# Patient Record
Sex: MALE | Race: WHITE | URBAN - METROPOLITAN AREA
[De-identification: names, ages, dates, MRNs, and addresses within clinical notes are randomized per-mention and may not be internally consistent; named-entity substitution may affect disease eponyms.]

---

## 2023-08-28 ENCOUNTER — HOSPITAL ENCOUNTER (OUTPATIENT)
Age: 73
Setting detail: SPECIMEN
Discharge: HOME | End: 2023-08-28

## 2023-08-28 LAB
ALBUMIN SERPL-MCNC: 3.2 G/DL (ref 3.5–5.2)
ALP SERPL-CCNC: 104 U/L (ref 40–129)
ALT SERPL-CCNC: 31 U/L (ref 5–41)
ANION GAP SERPL CALCULATED.3IONS-SCNC: 11 MMOL/L (ref 9–17)
AST SERPL-CCNC: 29 U/L
BILIRUB SERPL-MCNC: 0.3 MG/DL (ref 0.3–1.2)
BUN SERPL-MCNC: 25 MG/DL (ref 8–23)
BUN/CREAT SERPL: 28 (ref 9–20)
CALCIUM SERPL-MCNC: 9.4 MG/DL (ref 8.6–10.4)
CHLORIDE SERPL-SCNC: 104 MMOL/L (ref 98–107)
CO2 SERPL-SCNC: 25 MMOL/L (ref 20–31)
CREAT SERPL-MCNC: 0.9 MG/DL (ref 0.7–1.2)
ERYTHROCYTE [DISTWIDTH] IN BLOOD BY AUTOMATED COUNT: 14.4 % (ref 11.8–14.4)
GFR SERPL CREATININE-BSD FRML MDRD: >60 ML/MIN/1.73M2
GLUCOSE SERPL-MCNC: 134 MG/DL (ref 70–99)
HCT VFR BLD AUTO: 35.5 % (ref 40.7–50.3)
HGB BLD-MCNC: 11.8 G/DL (ref 13–17)
MCH RBC QN AUTO: 30.5 PG (ref 25.2–33.5)
MCHC RBC AUTO-ENTMCNC: 33.2 G/DL (ref 28.4–34.8)
MCV RBC AUTO: 91.7 FL (ref 82.6–102.9)
NRBC BLD-RTO: 0 PER 100 WBC
PLATELET # BLD AUTO: 301 K/UL (ref 138–453)
PMV BLD AUTO: 9.4 FL (ref 8.1–13.5)
POTASSIUM SERPL-SCNC: 4 MMOL/L (ref 3.7–5.3)
PROT SERPL-MCNC: 6.6 G/DL (ref 6.4–8.3)
RBC # BLD AUTO: 3.87 M/UL (ref 4.21–5.77)
SODIUM SERPL-SCNC: 140 MMOL/L (ref 135–144)
WBC OTHER # BLD: 9.5 K/UL (ref 3.5–11.3)

## 2023-08-28 PROCEDURE — 85027 COMPLETE CBC AUTOMATED: CPT

## 2023-08-28 PROCEDURE — 36415 COLL VENOUS BLD VENIPUNCTURE: CPT

## 2023-08-28 PROCEDURE — P9603 ONE-WAY ALLOW PRORATED MILES: HCPCS

## 2023-08-28 PROCEDURE — 80053 COMPREHEN METABOLIC PANEL: CPT

## 2023-09-07 ENCOUNTER — HOSPITAL ENCOUNTER (INPATIENT)
Age: 73
LOS: 15 days | Discharge: ANOTHER HOSPITAL | End: 2023-09-22
Attending: EMERGENCY MEDICINE | Admitting: STUDENT IN AN ORGANIZED HEALTH CARE EDUCATION/TRAINING PROGRAM
Payer: COMMERCIAL

## 2023-09-07 ENCOUNTER — APPOINTMENT (OUTPATIENT)
Dept: CT IMAGING | Age: 73
End: 2023-09-07
Payer: COMMERCIAL

## 2023-09-07 DIAGNOSIS — R20.0 NUMBNESS: ICD-10-CM

## 2023-09-07 DIAGNOSIS — R79.89 ELEVATED TROPONIN: Primary | ICD-10-CM

## 2023-09-07 DIAGNOSIS — C79.51 CANCER, METASTATIC TO BONE (HCC): ICD-10-CM

## 2023-09-07 DIAGNOSIS — R91.8 LUNG MASS: ICD-10-CM

## 2023-09-07 DIAGNOSIS — R53.1 GENERALIZED WEAKNESS: ICD-10-CM

## 2023-09-07 PROBLEM — R41.82 ALTERED MENTAL STATUS: Status: ACTIVE | Noted: 2023-09-07

## 2023-09-07 PROBLEM — I73.9 PERIPHERAL ARTERIAL DISEASE (HCC): Status: ACTIVE | Noted: 2023-08-04

## 2023-09-07 PROBLEM — F17.210 CIGARETTE SMOKER: Status: ACTIVE | Noted: 2023-08-04

## 2023-09-07 PROBLEM — S88.119A BELOW KNEE AMPUTATION (HCC): Status: ACTIVE | Noted: 2023-08-04

## 2023-09-07 LAB
ALBUMIN SERPL-MCNC: 3.2 G/DL (ref 3.5–5.2)
ALBUMIN/GLOB SERPL: 0.9 {RATIO} (ref 1–2.5)
ALP SERPL-CCNC: 123 U/L (ref 40–129)
ALT SERPL-CCNC: 47 U/L (ref 5–41)
ANION GAP SERPL CALCULATED.3IONS-SCNC: 7 MMOL/L (ref 9–17)
AST SERPL-CCNC: 38 U/L
BASOPHILS # BLD: 0.1 K/UL (ref 0–0.2)
BASOPHILS NFR BLD: 1 % (ref 0–2)
BILIRUB DIRECT SERPL-MCNC: 0.1 MG/DL
BILIRUB INDIRECT SERPL-MCNC: 0.3 MG/DL (ref 0–1)
BILIRUB SERPL-MCNC: 0.4 MG/DL (ref 0.3–1.2)
BILIRUB UR QL STRIP: NEGATIVE
BUN SERPL-MCNC: 19 MG/DL (ref 8–23)
CALCIUM SERPL-MCNC: 9 MG/DL (ref 8.6–10.4)
CHLORIDE SERPL-SCNC: 98 MMOL/L (ref 98–107)
CLARITY UR: CLEAR
CO2 SERPL-SCNC: 27 MMOL/L (ref 20–31)
COLOR UR: YELLOW
COMMENT: NORMAL
CREAT SERPL-MCNC: 0.8 MG/DL (ref 0.7–1.2)
EOSINOPHIL # BLD: 0.1 K/UL (ref 0–0.4)
EOSINOPHILS RELATIVE PERCENT: 2 % (ref 1–4)
ERYTHROCYTE [DISTWIDTH] IN BLOOD BY AUTOMATED COUNT: 14.6 % (ref 12.5–15.4)
GFR SERPL CREATININE-BSD FRML MDRD: >60 ML/MIN/1.73M2
GLUCOSE BLD-MCNC: 205 MG/DL (ref 75–110)
GLUCOSE SERPL-MCNC: 268 MG/DL (ref 70–99)
GLUCOSE UR STRIP-MCNC: NEGATIVE MG/DL
HCT VFR BLD AUTO: 37.3 % (ref 41–53)
HGB BLD-MCNC: 12.6 G/DL (ref 13.5–17.5)
HGB UR QL STRIP.AUTO: NEGATIVE
KETONES UR STRIP-MCNC: NEGATIVE MG/DL
LEUKOCYTE ESTERASE UR QL STRIP: NEGATIVE
LYMPHOCYTES NFR BLD: 2.1 K/UL (ref 1–4.8)
LYMPHOCYTES RELATIVE PERCENT: 25 % (ref 24–44)
MAGNESIUM SERPL-MCNC: 1.7 MG/DL (ref 1.6–2.6)
MCH RBC QN AUTO: 30.6 PG (ref 26–34)
MCHC RBC AUTO-ENTMCNC: 33.7 G/DL (ref 31–37)
MCV RBC AUTO: 90.7 FL (ref 80–100)
MONOCYTES NFR BLD: 0.9 K/UL (ref 0.1–1.2)
MONOCYTES NFR BLD: 11 % (ref 2–11)
NEUTROPHILS NFR BLD: 61 % (ref 36–66)
NEUTS SEG NFR BLD: 5.3 K/UL (ref 1.8–7.7)
NITRITE UR QL STRIP: NEGATIVE
PH UR STRIP: 6 [PH] (ref 5–8)
PLATELET # BLD AUTO: 324 K/UL (ref 140–450)
PMV BLD AUTO: 7.2 FL (ref 6–12)
POTASSIUM SERPL-SCNC: 4.1 MMOL/L (ref 3.7–5.3)
PROT SERPL-MCNC: 6.7 G/DL (ref 6.4–8.3)
PROT UR STRIP-MCNC: NEGATIVE MG/DL
RBC # BLD AUTO: 4.12 M/UL (ref 4.5–5.9)
SARS-COV-2 RDRP RESP QL NAA+PROBE: NOT DETECTED
SODIUM SERPL-SCNC: 132 MMOL/L (ref 135–144)
SP GR UR STRIP: 1.02 (ref 1–1.03)
SPECIMEN DESCRIPTION: NORMAL
TROPONIN I SERPL HS-MCNC: 48 NG/L (ref 0–22)
TROPONIN I SERPL HS-MCNC: 53 NG/L (ref 0–22)
TROPONIN I SERPL HS-MCNC: 55 NG/L (ref 0–22)
TSH SERPL DL<=0.05 MIU/L-ACNC: 0.93 UIU/ML (ref 0.3–5)
UROBILINOGEN UR STRIP-ACNC: NORMAL EU/DL (ref 0–1)
WBC OTHER # BLD: 8.5 K/UL (ref 3.5–11)

## 2023-09-07 PROCEDURE — 93005 ELECTROCARDIOGRAM TRACING: CPT | Performed by: EMERGENCY MEDICINE

## 2023-09-07 PROCEDURE — 6370000000 HC RX 637 (ALT 250 FOR IP): Performed by: STUDENT IN AN ORGANIZED HEALTH CARE EDUCATION/TRAINING PROGRAM

## 2023-09-07 PROCEDURE — 6360000004 HC RX CONTRAST MEDICATION: Performed by: EMERGENCY MEDICINE

## 2023-09-07 PROCEDURE — 70498 CT ANGIOGRAPHY NECK: CPT

## 2023-09-07 PROCEDURE — 80048 BASIC METABOLIC PNL TOTAL CA: CPT

## 2023-09-07 PROCEDURE — 82947 ASSAY GLUCOSE BLOOD QUANT: CPT

## 2023-09-07 PROCEDURE — 36415 COLL VENOUS BLD VENIPUNCTURE: CPT

## 2023-09-07 PROCEDURE — 70496 CT ANGIOGRAPHY HEAD: CPT

## 2023-09-07 PROCEDURE — 84484 ASSAY OF TROPONIN QUANT: CPT

## 2023-09-07 PROCEDURE — 84443 ASSAY THYROID STIM HORMONE: CPT

## 2023-09-07 PROCEDURE — 6370000000 HC RX 637 (ALT 250 FOR IP)

## 2023-09-07 PROCEDURE — 2580000003 HC RX 258: Performed by: STUDENT IN AN ORGANIZED HEALTH CARE EDUCATION/TRAINING PROGRAM

## 2023-09-07 PROCEDURE — 87635 SARS-COV-2 COVID-19 AMP PRB: CPT

## 2023-09-07 PROCEDURE — 99222 1ST HOSP IP/OBS MODERATE 55: CPT | Performed by: STUDENT IN AN ORGANIZED HEALTH CARE EDUCATION/TRAINING PROGRAM

## 2023-09-07 PROCEDURE — 99285 EMERGENCY DEPT VISIT HI MDM: CPT

## 2023-09-07 PROCEDURE — 1200000000 HC SEMI PRIVATE

## 2023-09-07 PROCEDURE — 70450 CT HEAD/BRAIN W/O DYE: CPT

## 2023-09-07 PROCEDURE — 80076 HEPATIC FUNCTION PANEL: CPT

## 2023-09-07 PROCEDURE — 83735 ASSAY OF MAGNESIUM: CPT

## 2023-09-07 PROCEDURE — 2580000003 HC RX 258: Performed by: EMERGENCY MEDICINE

## 2023-09-07 PROCEDURE — 81003 URINALYSIS AUTO W/O SCOPE: CPT

## 2023-09-07 PROCEDURE — 85025 COMPLETE CBC W/AUTO DIFF WBC: CPT

## 2023-09-07 RX ORDER — 0.9 % SODIUM CHLORIDE 0.9 %
80 INTRAVENOUS SOLUTION INTRAVENOUS ONCE
Status: DISCONTINUED | OUTPATIENT
Start: 2023-09-07 | End: 2023-09-22 | Stop reason: HOSPADM

## 2023-09-07 RX ORDER — CYCLOBENZAPRINE HCL 10 MG
5 TABLET ORAL 2 TIMES DAILY PRN
Status: DISCONTINUED | OUTPATIENT
Start: 2023-09-07 | End: 2023-09-10

## 2023-09-07 RX ORDER — SODIUM CHLORIDE 0.9 % (FLUSH) 0.9 %
5-40 SYRINGE (ML) INJECTION PRN
Status: DISCONTINUED | OUTPATIENT
Start: 2023-09-07 | End: 2023-09-22 | Stop reason: HOSPADM

## 2023-09-07 RX ORDER — DEXTROSE MONOHYDRATE 100 MG/ML
INJECTION, SOLUTION INTRAVENOUS CONTINUOUS PRN
Status: DISCONTINUED | OUTPATIENT
Start: 2023-09-07 | End: 2023-09-22 | Stop reason: HOSPADM

## 2023-09-07 RX ORDER — OMEPRAZOLE 20 MG/1
20 CAPSULE, DELAYED RELEASE ORAL DAILY
COMMUNITY

## 2023-09-07 RX ORDER — CHOLECALCIFEROL (VITAMIN D3) 125 MCG
5 CAPSULE ORAL DAILY
COMMUNITY

## 2023-09-07 RX ORDER — ALOGLIPTIN 12.5 MG/1
12.5 TABLET, FILM COATED ORAL DAILY
Status: DISCONTINUED | OUTPATIENT
Start: 2023-09-07 | End: 2023-09-22 | Stop reason: HOSPADM

## 2023-09-07 RX ORDER — CARVEDILOL 12.5 MG/1
12.5 TABLET ORAL 2 TIMES DAILY WITH MEALS
COMMUNITY

## 2023-09-07 RX ORDER — INSULIN LISPRO 100 [IU]/ML
0-8 INJECTION, SOLUTION INTRAVENOUS; SUBCUTANEOUS
Status: DISCONTINUED | OUTPATIENT
Start: 2023-09-08 | End: 2023-09-15

## 2023-09-07 RX ORDER — CYCLOBENZAPRINE HCL 5 MG
5 TABLET ORAL 2 TIMES DAILY PRN
COMMUNITY

## 2023-09-07 RX ORDER — ACETAMINOPHEN 325 MG/1
TABLET ORAL
Status: COMPLETED
Start: 2023-09-07 | End: 2023-09-07

## 2023-09-07 RX ORDER — CARVEDILOL 12.5 MG/1
12.5 TABLET ORAL 2 TIMES DAILY WITH MEALS
Status: DISCONTINUED | OUTPATIENT
Start: 2023-09-07 | End: 2023-09-09

## 2023-09-07 RX ORDER — INSULIN ASPART 100 [IU]/ML
INJECTION, SOLUTION INTRAVENOUS; SUBCUTANEOUS
COMMUNITY

## 2023-09-07 RX ORDER — ACETAMINOPHEN 650 MG/1
650 SUPPOSITORY RECTAL EVERY 6 HOURS PRN
Status: DISCONTINUED | OUTPATIENT
Start: 2023-09-07 | End: 2023-09-22 | Stop reason: HOSPADM

## 2023-09-07 RX ORDER — SODIUM CHLORIDE 9 MG/ML
INJECTION, SOLUTION INTRAVENOUS PRN
Status: DISCONTINUED | OUTPATIENT
Start: 2023-09-07 | End: 2023-09-22 | Stop reason: HOSPADM

## 2023-09-07 RX ORDER — TAMSULOSIN HYDROCHLORIDE 0.4 MG/1
0.4 CAPSULE ORAL DAILY
Status: DISCONTINUED | OUTPATIENT
Start: 2023-09-08 | End: 2023-09-22 | Stop reason: HOSPADM

## 2023-09-07 RX ORDER — HYDROCODONE BITARTRATE AND ACETAMINOPHEN 5; 325 MG/1; MG/1
1 TABLET ORAL EVERY 4 HOURS PRN
COMMUNITY

## 2023-09-07 RX ORDER — SODIUM CHLORIDE 0.9 % (FLUSH) 0.9 %
10 SYRINGE (ML) INJECTION ONCE
Status: COMPLETED | OUTPATIENT
Start: 2023-09-07 | End: 2023-09-07

## 2023-09-07 RX ORDER — GABAPENTIN 300 MG/1
600 CAPSULE ORAL 2 TIMES DAILY
Status: DISCONTINUED | OUTPATIENT
Start: 2023-09-07 | End: 2023-09-22 | Stop reason: HOSPADM

## 2023-09-07 RX ORDER — PANTOPRAZOLE SODIUM 40 MG/1
40 TABLET, DELAYED RELEASE ORAL
Status: DISCONTINUED | OUTPATIENT
Start: 2023-09-08 | End: 2023-09-22 | Stop reason: HOSPADM

## 2023-09-07 RX ORDER — ONDANSETRON 4 MG/1
4 TABLET, ORALLY DISINTEGRATING ORAL EVERY 8 HOURS PRN
Status: DISCONTINUED | OUTPATIENT
Start: 2023-09-07 | End: 2023-09-22 | Stop reason: HOSPADM

## 2023-09-07 RX ORDER — CEPHALEXIN 500 MG/1
500 CAPSULE ORAL 3 TIMES DAILY
Status: ON HOLD | COMMUNITY
Start: 2023-09-01 | End: 2023-09-21 | Stop reason: HOSPADM

## 2023-09-07 RX ORDER — GABAPENTIN 600 MG/1
600 TABLET ORAL 2 TIMES DAILY
COMMUNITY

## 2023-09-07 RX ORDER — ATORVASTATIN CALCIUM 80 MG/1
80 TABLET, FILM COATED ORAL DAILY
COMMUNITY

## 2023-09-07 RX ORDER — ONDANSETRON 2 MG/ML
4 INJECTION INTRAMUSCULAR; INTRAVENOUS EVERY 6 HOURS PRN
Status: DISCONTINUED | OUTPATIENT
Start: 2023-09-07 | End: 2023-09-22 | Stop reason: HOSPADM

## 2023-09-07 RX ORDER — ALOGLIPTIN 12.5 MG/1
12.5 TABLET, FILM COATED ORAL DAILY
COMMUNITY

## 2023-09-07 RX ORDER — TAMSULOSIN HYDROCHLORIDE 0.4 MG/1
0.4 CAPSULE ORAL DAILY
COMMUNITY

## 2023-09-07 RX ORDER — ATORVASTATIN CALCIUM 40 MG/1
80 TABLET, FILM COATED ORAL DAILY
Status: DISCONTINUED | OUTPATIENT
Start: 2023-09-07 | End: 2023-09-22 | Stop reason: HOSPADM

## 2023-09-07 RX ORDER — INSULIN LISPRO 100 [IU]/ML
0-4 INJECTION, SOLUTION INTRAVENOUS; SUBCUTANEOUS NIGHTLY
Status: DISCONTINUED | OUTPATIENT
Start: 2023-09-07 | End: 2023-09-15

## 2023-09-07 RX ORDER — GLUCAGON 1 MG/ML
1 KIT INJECTION PRN
Status: DISCONTINUED | OUTPATIENT
Start: 2023-09-07 | End: 2023-09-22 | Stop reason: HOSPADM

## 2023-09-07 RX ORDER — 0.9 % SODIUM CHLORIDE 0.9 %
1000 INTRAVENOUS SOLUTION INTRAVENOUS ONCE
Status: COMPLETED | OUTPATIENT
Start: 2023-09-07 | End: 2023-09-07

## 2023-09-07 RX ORDER — SODIUM CHLORIDE 0.9 % (FLUSH) 0.9 %
5-40 SYRINGE (ML) INJECTION EVERY 12 HOURS SCHEDULED
Status: DISCONTINUED | OUTPATIENT
Start: 2023-09-07 | End: 2023-09-22 | Stop reason: HOSPADM

## 2023-09-07 RX ORDER — ASPIRIN 81 MG/1
81 TABLET ORAL DAILY
Status: DISCONTINUED | OUTPATIENT
Start: 2023-09-07 | End: 2023-09-14

## 2023-09-07 RX ORDER — GABAPENTIN 600 MG/1
600 TABLET ORAL 2 TIMES DAILY
Status: DISCONTINUED | OUTPATIENT
Start: 2023-09-07 | End: 2023-09-07 | Stop reason: RX

## 2023-09-07 RX ORDER — ACETAMINOPHEN 325 MG/1
650 TABLET ORAL EVERY 6 HOURS PRN
Status: DISCONTINUED | OUTPATIENT
Start: 2023-09-07 | End: 2023-09-22 | Stop reason: HOSPADM

## 2023-09-07 RX ORDER — POLYETHYLENE GLYCOL 3350 17 G/17G
17 POWDER, FOR SOLUTION ORAL DAILY PRN
Status: DISCONTINUED | OUTPATIENT
Start: 2023-09-07 | End: 2023-09-22 | Stop reason: HOSPADM

## 2023-09-07 RX ORDER — ASPIRIN 81 MG/1
81 TABLET ORAL DAILY
COMMUNITY

## 2023-09-07 RX ORDER — FOLIC ACID 1 MG/1
1 TABLET ORAL DAILY
COMMUNITY

## 2023-09-07 RX ADMIN — ACETAMINOPHEN 650 MG: 325 TABLET ORAL at 17:58

## 2023-09-07 RX ADMIN — SODIUM CHLORIDE, PRESERVATIVE FREE 10 ML: 5 INJECTION INTRAVENOUS at 20:53

## 2023-09-07 RX ADMIN — SODIUM CHLORIDE, PRESERVATIVE FREE 10 ML: 5 INJECTION INTRAVENOUS at 13:02

## 2023-09-07 RX ADMIN — ATORVASTATIN CALCIUM 80 MG: 40 TABLET, FILM COATED ORAL at 20:51

## 2023-09-07 RX ADMIN — CYCLOBENZAPRINE 5 MG: 10 TABLET, FILM COATED ORAL at 18:16

## 2023-09-07 RX ADMIN — Medication 80 ML: at 13:03

## 2023-09-07 RX ADMIN — SODIUM CHLORIDE 1000 ML: 9 INJECTION, SOLUTION INTRAVENOUS at 12:47

## 2023-09-07 RX ADMIN — APIXABAN 5 MG: 5 TABLET, FILM COATED ORAL at 20:51

## 2023-09-07 RX ADMIN — IOPAMIDOL 100 ML: 755 INJECTION, SOLUTION INTRAVENOUS at 12:56

## 2023-09-07 RX ADMIN — GABAPENTIN 600 MG: 300 CAPSULE ORAL at 20:51

## 2023-09-07 RX ADMIN — CARVEDILOL 12.5 MG: 12.5 TABLET, FILM COATED ORAL at 20:51

## 2023-09-07 ASSESSMENT — ENCOUNTER SYMPTOMS
SHORTNESS OF BREATH: 0
ABDOMINAL PAIN: 0

## 2023-09-07 ASSESSMENT — PAIN - FUNCTIONAL ASSESSMENT: PAIN_FUNCTIONAL_ASSESSMENT: NONE - DENIES PAIN

## 2023-09-07 NOTE — ED NOTES
NEW FORWARD SEARCH     Clay Fox MD   Troponin result #2 =53 (down from 55)  Patient: Genevieve Estes   9/7/23   4:48 PM       Clay Fox MD   Patient:   Genevieve Estes    YOB: 1950  MRN:   [id number]  Location: [facility]    2TRAUMA-2TRAUMA   9/7/23 4:48 PM  [facility] [place] From: Trixie Acuna RN RE: Johanne Keyes RM: [id number] Troponin result #2 =53 (down from 54)  Luc Odonnell RN  09/07/23 6784

## 2023-09-07 NOTE — ED NOTES
Urine collected and sent to lab, however urine spilled onto brief and sheets, brief and sheets changed with 2 person assist, pt repositioned on left side.       Christine Redding RN  09/07/23 8928

## 2023-09-07 NOTE — ED PROVIDER NOTES
Respiratory:  Negative for shortness of breath. Cardiovascular:  Negative for chest pain. Gastrointestinal:  Negative for abdominal pain. Musculoskeletal:  Positive for gait problem. Neurological:  Positive for weakness and numbness. All other systems reviewed and are negative. Except as noted above the remainder of the review of systems was reviewed and negative. PAST MEDICAL HISTORY     Past Medical History:   Diagnosis Date    CAD (coronary artery disease)     Falls     Myocardial infarction ([address])     PVD (peripheral vascular disease) ([address])     Urinary retention          SURGICALHISTORY       Past Surgical History:   Procedure Laterality Date    CARDIAC SURGERY      LEG AMPUTATION BELOW KNEE Right          CURRENT MEDICATIONS       Previous Medications    ALOGLIPTIN (NESINA) 12.5 MG TABS TABLET    Take 1 tablet by mouth daily    APIXABAN (ELIQUIS) 5 MG TABS TABLET    Take by mouth 2 times daily    ASPIRIN 81 MG EC TABLET    Take 1 tablet by mouth daily    ATORVASTATIN (LIPITOR) 80 MG TABLET    Take 1 tablet by mouth daily    CARVEDILOL (COREG) 12.5 MG TABLET    Take 1 tablet by mouth 2 times daily (with meals)    CEPHALEXIN (KEFLEX) 500 MG CAPSULE    Take 1 capsule by mouth 3 times daily    CYCLOBENZAPRINE (FLEXERIL) 5 MG TABLET    Take 1 tablet by mouth 2 times daily as needed for Muscle spasms    FOLIC ACID (FOLVITE) 1 MG TABLET    Take 1 tablet by mouth daily    GABAPENTIN (NEURONTIN) 600 MG TABLET    Take 1 tablet by mouth 2 times daily. HYDROCODONE-ACETAMINOPHEN (NORCO) 5-325 MG PER TABLET    Take 1 tablet by mouth every 4 hours as needed for Pain.  Max Daily Amount: 6 tablets    INSULIN ASPART (NOVOLOG FLEXPEN) 100 UNIT/ML INJECTION PEN    Inject into the skin 3 times daily (before meals) Sliding scale    MELATONIN 5 MG TABS TABLET    Take 1 tablet by mouth daily    NICOTINE (NICODERM CQ)    Place 1 patch onto the skin daily 21mg/24hr    OMEPRAZOLE (PRILOSEC) 20 MG DELAYED RELEASE Medications   sodium chloride 0.9 % bolus 80 mL (80 mLs IntraVENous Bolus from Bag 9/7/23 1303)   sodium chloride 0.9 % bolus 1,000 mL (0 mLs IntraVENous Stopped 9/7/23 1559)   sodium chloride flush 0.9 % injection 10 mL (10 mLs IntraVENous Given 9/7/23 1302)   iopamidol (ISOVUE-370) 76 % injection 100 mL (100 mLs IntraVENous Given 9/7/23 1256)       New Prescriptions from this visit:    New Prescriptions    No medications on file       Follow-up:  No follow-up provider specified. Final Impression:   1. Elevated troponin    2.  Generalized weakness               (Please note that portions of this note were completed with a voice recognitionprogram.  Efforts were made to edit the dictations but occasionally words are mis-transcribed.)    Wallace Macdonald MD (electronically signed)  Attending Emergency Physician            Wallace Macdonald MD  09/07/23 5101

## 2023-09-08 ENCOUNTER — APPOINTMENT (OUTPATIENT)
Dept: MRI IMAGING | Age: 73
End: 2023-09-08
Payer: COMMERCIAL

## 2023-09-08 PROBLEM — I69.30 CEREBRAL MULTI-INFARCT STATE: Status: ACTIVE | Noted: 2023-09-08

## 2023-09-08 PROBLEM — G95.9 CERVICAL MYELOPATHY (HCC): Status: ACTIVE | Noted: 2023-09-08

## 2023-09-08 LAB
ANION GAP SERPL CALCULATED.3IONS-SCNC: 8 MMOL/L (ref 9–17)
BASOPHILS # BLD: 0.1 K/UL (ref 0–0.2)
BASOPHILS NFR BLD: 1 % (ref 0–2)
BUN SERPL-MCNC: 18 MG/DL (ref 8–23)
CALCIUM SERPL-MCNC: 9 MG/DL (ref 8.6–10.4)
CHLORIDE SERPL-SCNC: 103 MMOL/L (ref 98–107)
CO2 SERPL-SCNC: 26 MMOL/L (ref 20–31)
CREAT SERPL-MCNC: 0.7 MG/DL (ref 0.7–1.2)
EOSINOPHIL # BLD: 0.2 K/UL (ref 0–0.4)
EOSINOPHILS RELATIVE PERCENT: 2 % (ref 1–4)
ERYTHROCYTE [DISTWIDTH] IN BLOOD BY AUTOMATED COUNT: 14.3 % (ref 12.5–15.4)
GFR SERPL CREATININE-BSD FRML MDRD: >60 ML/MIN/1.73M2
GLUCOSE BLD-MCNC: 179 MG/DL (ref 75–110)
GLUCOSE BLD-MCNC: 187 MG/DL (ref 75–110)
GLUCOSE BLD-MCNC: 202 MG/DL (ref 75–110)
GLUCOSE SERPL-MCNC: 231 MG/DL (ref 70–99)
HCT VFR BLD AUTO: 35.5 % (ref 41–53)
HGB BLD-MCNC: 12 G/DL (ref 13.5–17.5)
LYMPHOCYTES NFR BLD: 2.5 K/UL (ref 1–4.8)
LYMPHOCYTES RELATIVE PERCENT: 33 % (ref 24–44)
MCH RBC QN AUTO: 30.6 PG (ref 26–34)
MCHC RBC AUTO-ENTMCNC: 33.8 G/DL (ref 31–37)
MCV RBC AUTO: 90.5 FL (ref 80–100)
MONOCYTES NFR BLD: 0.9 K/UL (ref 0.1–1.2)
MONOCYTES NFR BLD: 11 % (ref 2–11)
NEUTROPHILS NFR BLD: 53 % (ref 36–66)
NEUTS SEG NFR BLD: 4 K/UL (ref 1.8–7.7)
PLATELET # BLD AUTO: 319 K/UL (ref 140–450)
PMV BLD AUTO: 7.3 FL (ref 6–12)
POTASSIUM SERPL-SCNC: 4.2 MMOL/L (ref 3.7–5.3)
RBC # BLD AUTO: 3.92 M/UL (ref 4.5–5.9)
SODIUM SERPL-SCNC: 137 MMOL/L (ref 135–144)
TROPONIN I SERPL HS-MCNC: 43 NG/L (ref 0–22)
TROPONIN I SERPL HS-MCNC: 45 NG/L (ref 0–22)
TROPONIN I SERPL HS-MCNC: 46 NG/L (ref 0–22)
TROPONIN I SERPL HS-MCNC: 47 NG/L (ref 0–22)
WBC OTHER # BLD: 7.7 K/UL (ref 3.5–11)

## 2023-09-08 PROCEDURE — 2580000003 HC RX 258: Performed by: STUDENT IN AN ORGANIZED HEALTH CARE EDUCATION/TRAINING PROGRAM

## 2023-09-08 PROCEDURE — 99222 1ST HOSP IP/OBS MODERATE 55: CPT | Performed by: PSYCHIATRY & NEUROLOGY

## 2023-09-08 PROCEDURE — 99232 SBSQ HOSP IP/OBS MODERATE 35: CPT | Performed by: RADIOLOGY

## 2023-09-08 PROCEDURE — 84484 ASSAY OF TROPONIN QUANT: CPT

## 2023-09-08 PROCEDURE — 97166 OT EVAL MOD COMPLEX 45 MIN: CPT

## 2023-09-08 PROCEDURE — 85025 COMPLETE CBC W/AUTO DIFF WBC: CPT

## 2023-09-08 PROCEDURE — 80048 BASIC METABOLIC PNL TOTAL CA: CPT

## 2023-09-08 PROCEDURE — 72141 MRI NECK SPINE W/O DYE: CPT

## 2023-09-08 PROCEDURE — 6370000000 HC RX 637 (ALT 250 FOR IP): Performed by: STUDENT IN AN ORGANIZED HEALTH CARE EDUCATION/TRAINING PROGRAM

## 2023-09-08 PROCEDURE — 82947 ASSAY GLUCOSE BLOOD QUANT: CPT

## 2023-09-08 PROCEDURE — 70551 MRI BRAIN STEM W/O DYE: CPT

## 2023-09-08 PROCEDURE — 1200000000 HC SEMI PRIVATE

## 2023-09-08 PROCEDURE — 97162 PT EVAL MOD COMPLEX 30 MIN: CPT

## 2023-09-08 PROCEDURE — 36415 COLL VENOUS BLD VENIPUNCTURE: CPT

## 2023-09-08 PROCEDURE — 97535 SELF CARE MNGMENT TRAINING: CPT

## 2023-09-08 PROCEDURE — 97530 THERAPEUTIC ACTIVITIES: CPT

## 2023-09-08 RX ORDER — INSULIN GLARGINE 100 [IU]/ML
10 INJECTION, SOLUTION SUBCUTANEOUS NIGHTLY
Status: DISCONTINUED | OUTPATIENT
Start: 2023-09-08 | End: 2023-09-16

## 2023-09-08 RX ORDER — OXYCODONE HYDROCHLORIDE AND ACETAMINOPHEN 5; 325 MG/1; MG/1
1 TABLET ORAL EVERY 4 HOURS PRN
Status: DISCONTINUED | OUTPATIENT
Start: 2023-09-08 | End: 2023-09-08

## 2023-09-08 RX ORDER — OXYCODONE HYDROCHLORIDE AND ACETAMINOPHEN 5; 325 MG/1; MG/1
2 TABLET ORAL EVERY 4 HOURS PRN
Status: DISCONTINUED | OUTPATIENT
Start: 2023-09-08 | End: 2023-09-15

## 2023-09-08 RX ADMIN — GABAPENTIN 600 MG: 300 CAPSULE ORAL at 23:14

## 2023-09-08 RX ADMIN — APIXABAN 5 MG: 5 TABLET, FILM COATED ORAL at 23:14

## 2023-09-08 RX ADMIN — OXYCODONE AND ACETAMINOPHEN 2 TABLET: 325; 5 TABLET ORAL at 18:48

## 2023-09-08 RX ADMIN — ATORVASTATIN CALCIUM 80 MG: 40 TABLET, FILM COATED ORAL at 08:26

## 2023-09-08 RX ADMIN — GABAPENTIN 600 MG: 300 CAPSULE ORAL at 08:26

## 2023-09-08 RX ADMIN — CARVEDILOL 12.5 MG: 12.5 TABLET, FILM COATED ORAL at 16:09

## 2023-09-08 RX ADMIN — CARVEDILOL 12.5 MG: 12.5 TABLET, FILM COATED ORAL at 08:27

## 2023-09-08 RX ADMIN — SODIUM CHLORIDE, PRESERVATIVE FREE 10 ML: 5 INJECTION INTRAVENOUS at 23:15

## 2023-09-08 RX ADMIN — CYCLOBENZAPRINE 5 MG: 10 TABLET, FILM COATED ORAL at 16:09

## 2023-09-08 RX ADMIN — INSULIN GLARGINE 10 UNITS: 100 INJECTION, SOLUTION SUBCUTANEOUS at 23:48

## 2023-09-08 RX ADMIN — CYCLOBENZAPRINE 5 MG: 10 TABLET, FILM COATED ORAL at 12:10

## 2023-09-08 RX ADMIN — TAMSULOSIN HYDROCHLORIDE 0.4 MG: 0.4 CAPSULE ORAL at 08:27

## 2023-09-08 RX ADMIN — OXYCODONE HYDROCHLORIDE AND ACETAMINOPHEN 1 TABLET: 5; 325 TABLET ORAL at 12:10

## 2023-09-08 RX ADMIN — OXYCODONE AND ACETAMINOPHEN 2 TABLET: 325; 5 TABLET ORAL at 23:14

## 2023-09-08 RX ADMIN — APIXABAN 5 MG: 5 TABLET, FILM COATED ORAL at 08:27

## 2023-09-08 RX ADMIN — OXYCODONE HYDROCHLORIDE AND ACETAMINOPHEN 1 TABLET: 5; 325 TABLET ORAL at 16:10

## 2023-09-08 RX ADMIN — INSULIN LISPRO 2 UNITS: 100 INJECTION, SOLUTION INTRAVENOUS; SUBCUTANEOUS at 12:12

## 2023-09-08 RX ADMIN — ALOGLIPTIN 12.5 MG: 12.5 TABLET, FILM COATED ORAL at 08:27

## 2023-09-08 RX ADMIN — ASPIRIN 81 MG: 81 TABLET, COATED ORAL at 08:26

## 2023-09-08 RX ADMIN — OXYCODONE HYDROCHLORIDE AND ACETAMINOPHEN 1 TABLET: 5; 325 TABLET ORAL at 08:26

## 2023-09-08 RX ADMIN — SODIUM CHLORIDE, PRESERVATIVE FREE 10 ML: 5 INJECTION INTRAVENOUS at 08:27

## 2023-09-08 ASSESSMENT — PAIN SCALES - GENERAL
PAINLEVEL_OUTOF10: 7

## 2023-09-08 NOTE — PLAN OF CARE
Problem: Discharge Planning  Goal: Discharge to home or other facility with appropriate resources  Outcome: Progressing     Problem: Safety - Adult  Goal: Free from fall injury  Outcome: Progressing     Problem: Skin/Tissue Integrity  Goal: Absence of new skin breakdown  Description: 1. Monitor for areas of redness and/or skin breakdown  2. Assess vascular access sites hourly  3. Every 4-6 hours minimum:  Change oxygen saturation probe site  4. Every 4-6 hours:  If on nasal continuous positive airway pressure, respiratory therapy assess nares and determine need for appliance change or resting period.   Outcome: Progressing     Problem: Metabolic/Fluid and Electrolytes - Adult  Goal: Electrolytes maintained within normal limits  Outcome: Progressing  Goal: Hemodynamic stability and optimal renal function maintained  Outcome: Progressing

## 2023-09-08 NOTE — PROGRESS NOTES
Physical Therapy  Facility/Department: [facility]  Physical Therapy Initial Assessment    Name: Severo Knife  : 1950  MRN: [id number]  Date of Service: 2023  Chief Complaint   Patient presents with    Numbness     Pt from [facility] via EMS with c/o extremity N x 4 since yesterday at 4am.  Pt also c/o having speech issues yesterday and not feeling \"mentally\" right. Decided to come to ED this am (30 hours later) due to numbness in upper and lower limbs. Pt is A/O, cooperative, speech clear. Discharge Recommendations:  Patient would benefit from continued therapy after discharge   PT Equipment Recommendations  Equipment Needed:  (TBD)      Patient Diagnosis(es): The primary encounter diagnosis was Elevated troponin. A diagnosis of Generalized weakness was also pertinent to this visit. Past Medical History:  has a past medical history of CAD (coronary artery disease), Falls, Myocardial infarction ([address]), PVD (peripheral vascular disease) ([address]), and Urinary retention. Past Surgical History:  has a past surgical history that includes Leg amputation below knee (Right) and Cardiac surgery. Assessment   Body Structures, Functions, Activity Limitations Requiring Skilled Therapeutic Intervention: Decreased functional mobility ; Decreased ROM; Decreased strength;Decreased safe awareness;Decreased endurance  Assessment: Pt admitted due to confusion and generalized weakness. Per pt, he has been at the SNF for about 5 weeks. Prior to this, pt lived alone at home, independent with transfers and w/c mobility. Pt has R BKA but states that he has not had any prosthetic fitting done. At [facility], pt was max to dependent for bed mobility. Sat on EOB x 6 min, CGA to mod A for balance. Attempted standing with AMERICO Marshall but pt only able to clear buttocks with max A x 2 after second attempt. Pt returned to bed and repositioned for comfort due to c/o R BKA muscle spasms.  Pt would benefit made;Assistance required to implement solutions;Assistance required to correct errors made  Insights: Decreased awareness of deficits  Initiation: Requires cues for some  Sequencing: Requires cues for some     Objective             AROM RLE (degrees)  RLE AROM: Exceptions  RLE General AROM: hip WFL but pt has R knee flexion contracture; currently unable to measure due to pt having muscle spasms; preferred position is 90 deg kn flexion  AROM LLE (degrees)  LLE AROM : Exceptions  LLE General AROM: WFL except L ankle dorsiflexion up to neutral only  AROM RUE (degrees)  RUE General AROM: refer to OT eval  AROM LUE (degrees)  LUE General AROM: refer to OT eval  Strength RLE  Strength RLE: Exception  Comment: pt has R BKA  R Hip Flexion: 3/5  R Hip ABduction: 2+/5  R Knee Flexion: N/A (pt having spasms, unable to test)  R Knee Extension: N/A (pt having spasms, unable to test)  Strength LLE  Strength LLE: Exception  L Hip Flexion: 3+/5  L Hip ABduction: 2+/5  L Knee Flexion: 3-/5  L Knee Extension: 3-/5  L Ankle Dorsiflexion: 3-/5  L Ankle Plantar Flexion: 3-/5  Strength RUE  Comment: see OT for details  Strength LUE  Comment: see OT for details        Bed mobility  Rolling to Left: Dependent/Total;2 Person assistance  Rolling to Right: Dependent/Total;2 Person assistance  Supine to Sit: Maximum assistance;2 Person assistance  Sit to Supine: Dependent/Total;2 Person assistance  Scooting: Dependent/Total  Bed Mobility Comments: sat on EOB for about 6 minutes, CGA to mod A for balance; R residual limb having spasms and in 90 deg flexion    Transfers  Comment: attempted to stand with AMERICO Marshall but pt only able to clear buttocks, max A x 2 during attempt      Balance  Posture: Fair (R residual limb in flexion, forward head posture)  Sitting - Static: Poor;+  Sitting - Dynamic: Poor  Comments: sitting assessed on EOB; pt unable to stand at this time           OutComes Score             AM-PAC Score  AM-PAC Inpatient Mobility

## 2023-09-08 NOTE — PROGRESS NOTES
Occupational Therapy  Facility/Department: [facility]  Occupational Therapy Initial Assessment    Name: Tara Wright  : 1950  MRN: [id number]  Date of Service: 2023  Chief Complaint   Patient presents with    Numbness     Pt from [facility] via EMS with c/o extremity N x 4 since yesterday at 4am.  Pt also c/o having speech issues yesterday and not feeling \"mentally\" right. Decided to come to ED this am (30 hours later) due to numbness in upper and lower limbs. Pt is A/O, cooperative, speech clear. Discharge Recommendations:  Patient would benefit from continued therapy after discharge  OT Equipment Recommendations  Equipment Needed: No       Patient Diagnosis(es): The primary encounter diagnosis was Elevated troponin. A diagnosis of Generalized weakness was also pertinent to this visit. Past Medical History:  has a past medical history of CAD (coronary artery disease), Falls, Myocardial infarction ([address]), PVD (peripheral vascular disease) ([address]), and Urinary retention. Past Surgical History:  has a past surgical history that includes Leg amputation below knee (Right) and Cardiac surgery. Assessment   Performance deficits / Impairments: Decreased functional mobility ; Decreased safe awareness;Decreased balance;Decreased coordination;Decreased ROM; Decreased endurance;Decreased ADL status; Decreased strength  Assessment: admit with AMS. CT head (-), CTA neck ? R lung nodule; L distal MCA attenuation, MRI Brain (-) infarct. Presents with the above deficits with balance, strength, tolerance being the main issues that limit pt skill. Pt states that a little over a month ago he was Indep from chair level with self care and family has assisted with home skills. Has been in the hospital and SNF within the last month and was a 2 assist with SPT and assisted with ADLs. Would benefit from OT here and at discharge to maximize participation in self care.  Pt unsafe to

## 2023-09-08 NOTE — CONSULTS
82 yo male with weakness . He reports that over the past 2 months to have generalized weakness that has increased over the past one month to the point where he has trouble lifting arms above head with decreased fine motor with decreased  bilaterally . He had right BKA in October and has been nonambulatory since then not having leg prosthesis . With increased weakness he is also having greater weakness of left leg with trouble supporting weight limiting ability to transfer . There has been numbness of bilateral arms along with left foot . He does report mild neck pain . Head CT with left temporal parietal along with right parietal encephalomalacia . CTA head and neck left V2 severe stenosis versus occlusion . MRI of Head with old right temporal parietal and old right parietal infarction with bilateral chronic periventricular small vessel ischemia. He has DVT on eliquis 5 mg po bid along with CAD on aspirin 81 po qd and lipitor 80 mg po qd . He has right BKA , falling  PVD . UA is normal. There is right lung nodule that has not been biopsied . Significant medications  eliquis 5 mg po bid,  aspirin 81 po qd , lipitor 80 mg po qd. Testing  . Head CT with left temporal parietal along with right parietal encephalomalacia . CTA head and neck left V2 severe stenosis versus occlusion. MRI of Head with old right temporal parietal and old right parietal infarction with bilateral chronic periventricular small vessel ischemia     Past Medical History:   Diagnosis Date    CAD (coronary artery disease)     Falls     Myocardial infarction ([address])     PVD (peripheral vascular disease) ([address])     Urinary retention        Past Surgical History:   Procedure Laterality Date    CARDIAC SURGERY      LEG AMPUTATION BELOW KNEE Right        History reviewed. No pertinent family history.     Social History     Socioeconomic History    Marital status: Single     Spouse name: None    Number of children: None    Years of education: None .General exam well groomed   Head/ Ears /Nose/Throat/external ear . Normal exam  Neck and thyroid . Normal size. No bruits  Respiratory . Breathsounds clear bilaterally  Cardiovascular: Auscultation of heart with regular rate and rhythm   Musculoskeletal. Muscle bulk and tone normal                                                           Muscle strength bilateral arms 4-/5 . Left leg 4-/5 . Right leg BKA  throughout                                                                                No dysmetria or dysdiadokinesis  No tremor   Decreased fine motor bilaterally   Orientation Alert and oriented x 3   Attention and concentration normal  Short term memory normal  Language process and speech normal . No aphasia   Cranial nerve 2 normal acuety and visual fields  Cranial nerve 3, 4 and 6 . Extraocular muscles are intact . Pupils are equal and reactive   Cranial nerve 5 . Intact corneal reflex. Normal facial sensation  Cranial nerve 7 normal exam   Cranial nerve 8. Grossly intact hearing   Cranial nerve 9 and 10. Symmetric palate elevation   Cranial nerve 11 , 5 out of 5 strength   Cranial Nerve 12 midline tongue . No atrophy  Sensation . Normal pinprick and light touch   Deep Tendon Reflexes hypoactive in arms . Absent in left eg   Plantar response right plantar response equivocal    Assessment :    Cervical myelopathy . 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 Right BKA     Plan:    MRI cervical spine .  Cardiac 2 D echo

## 2023-09-09 ENCOUNTER — APPOINTMENT (OUTPATIENT)
Age: 73
End: 2023-09-09
Attending: STUDENT IN AN ORGANIZED HEALTH CARE EDUCATION/TRAINING PROGRAM
Payer: COMMERCIAL

## 2023-09-09 LAB
ANION GAP SERPL CALCULATED.3IONS-SCNC: 8 MMOL/L (ref 9–17)
BASOPHILS # BLD: 0.03 K/UL (ref 0–0.2)
BASOPHILS NFR BLD: 0 % (ref 0–2)
BUN SERPL-MCNC: 20 MG/DL (ref 8–23)
CALCIUM SERPL-MCNC: 9 MG/DL (ref 8.6–10.4)
CHLORIDE SERPL-SCNC: 102 MMOL/L (ref 98–107)
CO2 SERPL-SCNC: 27 MMOL/L (ref 20–31)
CREAT SERPL-MCNC: 0.7 MG/DL (ref 0.7–1.2)
ECHO AO ASC DIAM: 3.2 CM
ECHO AO ASCENDING AORTA INDEX: 1.78 CM/M2
ECHO AO ROOT DIAM: 3.4 CM
ECHO AO ROOT INDEX: 1.89 CM/M2
ECHO AV AREA PEAK VELOCITY: 2.6 CM2
ECHO AV AREA VTI: 3 CM2
ECHO AV AREA/BSA PEAK VELOCITY: 1.4 CM2/M2
ECHO AV AREA/BSA VTI: 1.7 CM2/M2
ECHO AV MEAN GRADIENT: 3 MMHG
ECHO AV MEAN VELOCITY: 0.8 M/S
ECHO AV PEAK GRADIENT: 5 MMHG
ECHO AV PEAK VELOCITY: 1.1 M/S
ECHO AV VELOCITY RATIO: 0.73
ECHO AV VTI: 19.4 CM
ECHO BSA: 1.81 M2
ECHO LA AREA 2C: 12.9 CM2
ECHO LA AREA 4C: 13.1 CM2
ECHO LA DIAMETER INDEX: 1.94 CM/M2
ECHO LA DIAMETER: 3.5 CM
ECHO LA MAJOR AXIS: 4.5 CM
ECHO LA MINOR AXIS: 4.4 CM
ECHO LA TO AORTIC ROOT RATIO: 1.03
ECHO LA VOL 2C: 32 ML (ref 18–58)
ECHO LA VOL 4C: 32 ML (ref 18–58)
ECHO LA VOL BP: 32 ML (ref 18–58)
ECHO LA VOL/BSA BIPLANE: 18 ML/M2 (ref 16–34)
ECHO LA VOLUME INDEX A2C: 18 ML/M2 (ref 16–34)
ECHO LA VOLUME INDEX A4C: 18 ML/M2 (ref 16–34)
ECHO LV E' LATERAL VELOCITY: 6 CM/S
ECHO LV E' SEPTAL VELOCITY: 11 CM/S
ECHO LV FRACTIONAL SHORTENING: 17 % (ref 28–44)
ECHO LV INTERNAL DIMENSION DIASTOLE INDEX: 2.67 CM/M2
ECHO LV INTERNAL DIMENSION DIASTOLIC: 4.8 CM (ref 4.2–5.9)
ECHO LV INTERNAL DIMENSION SYSTOLIC INDEX: 2.22 CM/M2
ECHO LV INTERNAL DIMENSION SYSTOLIC: 4 CM
ECHO LV IVSD: 1 CM (ref 0.6–1)
ECHO LV MASS 2D: 170.2 G (ref 88–224)
ECHO LV MASS INDEX 2D: 94.6 G/M2 (ref 49–115)
ECHO LV POSTERIOR WALL DIASTOLIC: 1 CM (ref 0.6–1)
ECHO LV RELATIVE WALL THICKNESS RATIO: 0.42
ECHO LVOT AREA: 3.5 CM2
ECHO LVOT AV VTI INDEX: 0.87
ECHO LVOT DIAM: 2.1 CM
ECHO LVOT MEAN GRADIENT: 1 MMHG
ECHO LVOT PEAK GRADIENT: 3 MMHG
ECHO LVOT PEAK VELOCITY: 0.8 M/S
ECHO LVOT STROKE VOLUME INDEX: 32.5 ML/M2
ECHO LVOT SV: 58.5 ML
ECHO LVOT VTI: 16.9 CM
ECHO MV A VELOCITY: 0.79 M/S
ECHO MV E DECELERATION TIME (DT): 232 MS
ECHO MV E VELOCITY: 0.39 M/S
ECHO MV E/A RATIO: 0.49
ECHO MV E/E' LATERAL: 6.5
ECHO MV E/E' RATIO (AVERAGED): 5.02
ECHO MV E/E' SEPTAL: 3.55
ECHO PV MAX VELOCITY: 1 M/S
ECHO PV PEAK GRADIENT: 4 MMHG
ECHO RA AREA 4C: 14.4 CM2
ECHO RV BASAL DIMENSION: 3.1 CM
ECHO RV FREE WALL PEAK S': 13 CM/S
ECHO RV TAPSE: 2 CM (ref 1.7–?)
EKG ATRIAL RATE: 77 BPM
EKG P AXIS: 43 DEGREES
EKG P-R INTERVAL: 120 MS
EKG Q-T INTERVAL: 382 MS
EKG QRS DURATION: 76 MS
EKG QTC CALCULATION (BAZETT): 432 MS
EKG R AXIS: 12 DEGREES
EKG T AXIS: 59 DEGREES
EKG VENTRICULAR RATE: 77 BPM
EOSINOPHIL # BLD: 0.24 K/UL (ref 0–0.4)
EOSINOPHILS RELATIVE PERCENT: 3 % (ref 1–4)
ERYTHROCYTE [DISTWIDTH] IN BLOOD BY AUTOMATED COUNT: 14.9 % (ref 12.5–15.4)
GFR SERPL CREATININE-BSD FRML MDRD: >60 ML/MIN/1.73M2
GLUCOSE BLD-MCNC: 132 MG/DL (ref 75–110)
GLUCOSE BLD-MCNC: 182 MG/DL (ref 75–110)
GLUCOSE BLD-MCNC: 193 MG/DL (ref 75–110)
GLUCOSE BLD-MCNC: 202 MG/DL (ref 75–110)
GLUCOSE SERPL-MCNC: 154 MG/DL (ref 70–99)
HCT VFR BLD AUTO: 38.3 % (ref 41–53)
HGB BLD-MCNC: 12.4 G/DL (ref 13.5–17.5)
LYMPHOCYTES NFR BLD: 3.15 K/UL (ref 1–4.8)
LYMPHOCYTES RELATIVE PERCENT: 37 % (ref 24–44)
MCH RBC QN AUTO: 29.9 PG (ref 26–34)
MCHC RBC AUTO-ENTMCNC: 32.4 G/DL (ref 31–37)
MCV RBC AUTO: 92.3 FL (ref 80–100)
MONOCYTES NFR BLD: 1.02 K/UL (ref 0.1–1.2)
MONOCYTES NFR BLD: 12 % (ref 2–11)
NEUTROPHILS NFR BLD: 48 % (ref 36–66)
NEUTS SEG NFR BLD: 4.15 K/UL (ref 1.8–7.7)
PLATELET # BLD AUTO: 328 K/UL (ref 140–450)
PMV BLD AUTO: 9.4 FL (ref 8–14)
POTASSIUM SERPL-SCNC: 4.4 MMOL/L (ref 3.7–5.3)
RBC # BLD AUTO: 4.15 M/UL (ref 4.5–5.9)
SODIUM SERPL-SCNC: 137 MMOL/L (ref 135–144)
TROPONIN I SERPL HS-MCNC: 39 NG/L (ref 0–22)
TROPONIN I SERPL HS-MCNC: 40 NG/L (ref 0–22)
WBC OTHER # BLD: 8.6 K/UL (ref 3.5–11)

## 2023-09-09 PROCEDURE — 85025 COMPLETE CBC W/AUTO DIFF WBC: CPT

## 2023-09-09 PROCEDURE — 80048 BASIC METABOLIC PNL TOTAL CA: CPT

## 2023-09-09 PROCEDURE — C8929 TTE W OR WO FOL WCON,DOPPLER: HCPCS

## 2023-09-09 PROCEDURE — 93306 TTE W/DOPPLER COMPLETE: CPT | Performed by: INTERNAL MEDICINE

## 2023-09-09 PROCEDURE — 1200000000 HC SEMI PRIVATE

## 2023-09-09 PROCEDURE — 82947 ASSAY GLUCOSE BLOOD QUANT: CPT

## 2023-09-09 PROCEDURE — 99232 SBSQ HOSP IP/OBS MODERATE 35: CPT | Performed by: PSYCHIATRY & NEUROLOGY

## 2023-09-09 PROCEDURE — 84484 ASSAY OF TROPONIN QUANT: CPT

## 2023-09-09 PROCEDURE — 6370000000 HC RX 637 (ALT 250 FOR IP): Performed by: STUDENT IN AN ORGANIZED HEALTH CARE EDUCATION/TRAINING PROGRAM

## 2023-09-09 PROCEDURE — 36415 COLL VENOUS BLD VENIPUNCTURE: CPT

## 2023-09-09 PROCEDURE — 2580000003 HC RX 258: Performed by: STUDENT IN AN ORGANIZED HEALTH CARE EDUCATION/TRAINING PROGRAM

## 2023-09-09 PROCEDURE — 6360000004 HC RX CONTRAST MEDICATION: Performed by: STUDENT IN AN ORGANIZED HEALTH CARE EDUCATION/TRAINING PROGRAM

## 2023-09-09 PROCEDURE — 99232 SBSQ HOSP IP/OBS MODERATE 35: CPT | Performed by: STUDENT IN AN ORGANIZED HEALTH CARE EDUCATION/TRAINING PROGRAM

## 2023-09-09 RX ADMIN — ATORVASTATIN CALCIUM 80 MG: 40 TABLET, FILM COATED ORAL at 10:11

## 2023-09-09 RX ADMIN — PERFLUTREN 1.5 ML: 6.52 INJECTION, SUSPENSION INTRAVENOUS at 11:02

## 2023-09-09 RX ADMIN — OXYCODONE AND ACETAMINOPHEN 2 TABLET: 325; 5 TABLET ORAL at 08:18

## 2023-09-09 RX ADMIN — APIXABAN 5 MG: 5 TABLET, FILM COATED ORAL at 20:07

## 2023-09-09 RX ADMIN — GABAPENTIN 600 MG: 300 CAPSULE ORAL at 10:13

## 2023-09-09 RX ADMIN — ASPIRIN 81 MG: 81 TABLET, COATED ORAL at 10:12

## 2023-09-09 RX ADMIN — CYCLOBENZAPRINE 5 MG: 10 TABLET, FILM COATED ORAL at 08:18

## 2023-09-09 RX ADMIN — GABAPENTIN 600 MG: 300 CAPSULE ORAL at 20:07

## 2023-09-09 RX ADMIN — OXYCODONE AND ACETAMINOPHEN 2 TABLET: 325; 5 TABLET ORAL at 20:07

## 2023-09-09 RX ADMIN — INSULIN LISPRO 2 UNITS: 100 INJECTION, SOLUTION INTRAVENOUS; SUBCUTANEOUS at 13:07

## 2023-09-09 RX ADMIN — ALOGLIPTIN 12.5 MG: 12.5 TABLET, FILM COATED ORAL at 10:12

## 2023-09-09 RX ADMIN — SODIUM CHLORIDE, PRESERVATIVE FREE 10 ML: 5 INJECTION INTRAVENOUS at 20:14

## 2023-09-09 RX ADMIN — APIXABAN 5 MG: 5 TABLET, FILM COATED ORAL at 10:13

## 2023-09-09 RX ADMIN — INSULIN GLARGINE 10 UNITS: 100 INJECTION, SOLUTION SUBCUTANEOUS at 20:14

## 2023-09-09 ASSESSMENT — PAIN DESCRIPTION - LOCATION: LOCATION: OTHER (COMMENT)

## 2023-09-09 ASSESSMENT — PAIN DESCRIPTION - ORIENTATION: ORIENTATION: RIGHT

## 2023-09-09 ASSESSMENT — PAIN SCALES - GENERAL
PAINLEVEL_OUTOF10: 7
PAINLEVEL_OUTOF10: 7

## 2023-09-09 NOTE — PROGRESS NOTES
Active problem Cervical myelopathy . 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 Right BKA. The condition is MRI cervical spine with severe spina stenosis C3-4 and C4-5 with myelomalacia . Concern of bone marrow replacement C3 and C4 possible metastatic disease  . He is alert and oriented strength bilateral arms 4-/5 . Left leg 4-/5 . Right leg BKA  with decreased fine motor movement bilaterally . 80 yo male with weakness . He reports that over the past 2 months to have generalized weakness that has increased over the past one month to the point where he has trouble lifting arms above head with decreased fine motor with decreased  bilaterally . He had right BKA in October and has been nonambulatory since then not having leg prosthesis . With increased weakness he is also having greater weakness of left leg with trouble supporting weight limiting ability to transfer . There has been numbness of bilateral arms along with left foot . He does report mild neck pain . Head CT with left temporal parietal along with right parietal encephalomalacia . CTA head and neck left V2 severe stenosis versus occlusion . MRI of Head with old right temporal parietal and old right parietal infarction with bilateral chronic periventricular small vessel ischemia. He has DVT on eliquis 5 mg po bid along with CAD on aspirin 81 po qd and lipitor 80 mg po qd . He has right BKA , falling  PVD . UA is normal. There is right lung nodule that has not been biopsied . Significant medications  eliquis 5 mg po bid,  aspirin 81 po qd , lipitor 80 mg po qd. Testing  . Head CT with left temporal parietal along with right parietal encephalomalacia . CTA head and neck left V2 severe stenosis versus occlusion. MRI of Head with old right temporal parietal and old right parietal infarction with bilateral chronic periventricular small vessel ischemia. MRI cervical spine with severe spina stenosis C3-4 and C4-5 with myelomalacia .  Concern of bone marrow replacement C3 and Oral Daily Yayo Menjivar MD   0.4 mg at 09/08/23 0827    sodium chloride flush 0.9 % injection 5-40 mL  5-40 mL IntraVENous 2 times per day Yayo Menjivar MD   10 mL at 09/08/23 2315    sodium chloride flush 0.9 % injection 5-40 mL  5-40 mL IntraVENous PRN Yayo Menjivar MD        0.9 % sodium chloride infusion   IntraVENous PRN Yayo Menjivar MD        ondansetron (ZOFRAN-ODT) disintegrating tablet 4 mg  4 mg Oral Q8H PRN Yayo Menjivar MD        Or    ondansetron [facility]) injection 4 mg  4 mg IntraVENous Q6H PRN Yayo Menjivar MD        polyethylene glycol (GLYCOLAX) packet 17 g  17 g Oral Daily PRN Yayo Menjivar MD        acetaminophen (TYLENOL) tablet 650 mg  650 mg Oral Q6H PRN Yayo Menjivar MD   650 mg at 09/07/23 1758    Or    acetaminophen (TYLENOL) suppository 650 mg  650 mg Rectal Q6H PRN Yayo Menjivar MD        gabapentin (NEURONTIN) capsule 600 mg  600 mg Oral BID Yayo Menjivar MD   600 mg at 09/09/23 1013    insulin lispro (HUMALOG) injection vial 0-8 Units  0-8 Units SubCUTAneous TID WC Zee Haven, MD   2 Units at 09/08/23 1212    insulin lispro (HUMALOG) injection vial 0-4 Units  0-4 Units SubCUTAneous Nightly Yayo Menjivar MD        glucose chewable tablet 16 g  4 tablet Oral PRN Yayo Menjivar MD        dextrose bolus 10% 125 mL  125 mL IntraVENous PRN Yayo Menjivar MD        Or    dextrose bolus 10% 250 mL  250 mL IntraVENous PRN Yayo Menjivar MD        glucagon injection 1 mg  1 mg SubCUTAneous PRN Yayo Menjivar MD        dextrose 10 % infusion   IntraVENous Continuous PRN Yayo Menjivar MD           Allergies   Allergen Reactions    Ciprofloxacin        ROS:   Constitutional                  Negative for fever and chills   HEENT                            Negative for ear discharge, ear pain, nosebleed  Eyes                                Negative for photophobia, pain and discharge  Respiratory                      Negative for hemoptysis and sputum  Cardiovascular                Negative for

## 2023-09-09 NOTE — PROGRESS NOTES
Occupational [address]  Occupational Therapy Not Seen Note    DATE: 2023    NAME: Elio Gomez  MRN: [id number]   : 1950      Patient not seen this date for Occupational Therapy due to: Other: MD with patient and then assisted staff with repositioning patient, MAX-Dep for rolling and positioning, Dep for drink. Patient lethargic, falling asleep mid sentence. No treatment completed this date. Next Scheduled Treatment: Continue to pursue OT treatment.     Electronically signed by REINALDO Paul on 2023 at 3:35 PM

## 2023-09-09 NOTE — PROGRESS NOTES
[facility]  Office: [address], DO, Aren Falcon, DO, Shruthi Moreira, DO, Mani Pisano Blood, DO, Win Mendoza MD, Carol Wayne MD, Rudolph Aguayo MD, Candice Quiroga MD,  Joshua Rdz MD, Tomy Dickens MD, Deloris Lucas DO, Geronimo Sutherland MD,  Javier Rubio MD, Maykel Martins MD, Susan Melo DO, Lulu Pascal MD,  John Figueroa DO, Graham Echeverria MD, Jose D Callahan MD, Jesús Beard MD, Manuel Muir MD,  Lili Aguilar MD, Yonny Tang MD, Rashawn Reddy MD, Dajuan Salazar DO, Viola Cardenas MD,  Rae Valenzuela MD, Guanaco Bustos, CNP,  Sienna Perez, CNP,, Angelia Luna, CNP,  Latrell Headley, [facility], Amber Garnica, CNP, Rodney Guerra, CNP, Dian Drummond, CNP, Mitch Cardenas, CNP, Taylor Hernandez, CNP, Carla Up, CNP, Ernestina Vail PA-C, Erin Mesa, PORSCHE, Low Castro, CNP, Ramu Christianson, [address] [place] [address]    Progress Note    9/9/2023    9:56 AM    Name:   Tara Wright  MRN:     [id number]     Acct:      [de-identified]   Room:   [address] [place] [address] Day:  2  Admit Date:  9/7/2023 10:46 AM    PCP:   PROVIDER UNKNOWN  Code Status:  Full Code    Subjective:     Patient was seen and examined at bedside this AM. He reports feeling \"okay\" but continues to complain of severe, generalized weakness. MRI of the cervical spine is showing severe stenosis at C3-C5. Will consult orthopedic surgery at this time. Medications: Allergies:     Allergies   Allergen Reactions    Ciprofloxacin        Current Meds:   Scheduled Meds:    insulin glargine  10 Units SubCUTAneous Nightly    sodium chloride  80 mL IntraVENous Once    alogliptin  12.5 mg Oral Daily    apixaban  5 mg Oral BID    aspirin  81 mg Oral Daily    atorvastatin  80 mg Oral Daily    carvedilol  12.5 mg Oral BID WC    pantoprazole  40 mg Oral QAM AC    tamsulosin  0.4 mg Oral Daily    sodium chloride flush  5-40 mL IntraVENous 2

## 2023-09-09 NOTE — CONSULTS
Orthopedic Spine Consult   Brad Martins MD                     CC/Reason for consult: Cervical stenosis    HPI:      The patient is a 81 y.o. male with progressive weakness to both arms and hands, and this has been over the last several months. He notes significant loss of function after having a right BKA, has been in a wheelchair with difficulty now using both upper extremities. He has numbness and tingling of the hands and fingers, marked difficulty using them for even simple activities of grasp, pinch, or self-care. Pain has been progressive with numbness and tingling and essentially a function was use of both upper extremities. He has had several falls which she describes as controlled collisions, and is not sure if the weakness has started after those episodes. This has been over the last 6 months. He is recently hospitalized with multiple problems including severe weakness, and MRI scan was obtained which does show severe spinal stenosis multilevel in the neck. Past Medical History:    Past Medical History:   Diagnosis Date    CAD (coronary artery disease)     Falls     Myocardial infarction ([address])     PVD (peripheral vascular disease) ([address])     Urinary retention        Past Surgical History:    Past Surgical History:   Procedure Laterality Date    CARDIAC SURGERY      LEG AMPUTATION BELOW KNEE Right        Medications Prior to Admission:   Prior to Admission medications    Medication Sig Start Date End Date Taking? Authorizing Provider   insulin aspart (NOVOLOG FLEXPEN) 100 UNIT/ML injection pen Inject into the skin 3 times daily (before meals) Sliding scale   Yes Historical Provider, MD   gabapentin (NEURONTIN) 600 MG tablet Take 1 tablet by mouth 2 times daily.    Yes Historical Provider, MD   cephALEXin (KEFLEX) 500 MG capsule Take 1 capsule by mouth 3 times daily 9/1/23 9/11/23 Yes Historical Provider, MD   HYDROcodone-acetaminophen (NORCO) 5-325 MG per tablet Take 1 tablet by mouth every 4 cervical stenosis C3-4 C4-5-severe. Moderate C6-7  I discussed with the patient that he does have severe stenosis, and myelomalacia based on his MRI scan. This process has been going on for at least 4-6 months in order to see those changes. This may have been from a fall, from his previous surgery and hospitalization, and at this point due to his progressive weakness, it is unclear if this is treatable by surgical treatment, and I discussed that the damage could be done, and decompression may not give him any functional relief. I would recommend a cervical laminectomy C3, C4, C5, C6 and partial C7, and cervical fusion C3-4, C4-5, C5-6 and C6-7. I discussed options of simple laminectomy, to decrease the time surgically, he may develop some instability and then would require cervical fusion. Decompression alone would help him, and hopefully allow recovery and some functional improvement. Patient would like to consider surgery at the [facility] if able, as he is a . I discussed medically, concerns for his condition being off the Eliquis, and tolerated the surgical procedure. Surgery would be done under spinal cord monitoring. He will consider options, and we will follow with you.     --Please page/[email or web address] with any questions or concerns    Lucrecia Cheney MD, DO  Orthopedic Spine Surgery  3:24 PM 9/9/2023

## 2023-09-09 NOTE — PLAN OF CARE
Problem: Discharge Planning  Goal: Discharge to home or other facility with appropriate resources  Outcome: Progressing     Problem: Safety - Adult  Goal: Free from fall injury  Outcome: Progressing     Problem: Skin/Tissue Integrity  Goal: Absence of new skin breakdown  Description: 1. Monitor for areas of redness and/or skin breakdown  2. Assess vascular access sites hourly  3. Every 4-6 hours minimum:  Change oxygen saturation probe site  4. Every 4-6 hours:  If on nasal continuous positive airway pressure, respiratory therapy assess nares and determine need for appliance change or resting period.   Outcome: Progressing     Problem: Metabolic/Fluid and Electrolytes - Adult  Goal: Electrolytes maintained within normal limits  Outcome: Progressing     Problem: Chronic Conditions and Co-morbidities  Goal: Patient's chronic conditions and co-morbidity symptoms are monitored and maintained or improved  Outcome: Progressing

## 2023-09-10 LAB
ANION GAP SERPL CALCULATED.3IONS-SCNC: 7 MMOL/L (ref 9–17)
BASOPHILS # BLD: 0.1 K/UL (ref 0–0.2)
BASOPHILS NFR BLD: 1 % (ref 0–2)
BUN SERPL-MCNC: 25 MG/DL (ref 8–23)
CALCIUM SERPL-MCNC: 9 MG/DL (ref 8.6–10.4)
CHLORIDE SERPL-SCNC: 101 MMOL/L (ref 98–107)
CO2 SERPL-SCNC: 28 MMOL/L (ref 20–31)
CREAT SERPL-MCNC: 0.9 MG/DL (ref 0.7–1.2)
EOSINOPHIL # BLD: 0.2 K/UL (ref 0–0.4)
EOSINOPHILS RELATIVE PERCENT: 3 % (ref 1–4)
ERYTHROCYTE [DISTWIDTH] IN BLOOD BY AUTOMATED COUNT: 14.6 % (ref 12.5–15.4)
GFR SERPL CREATININE-BSD FRML MDRD: >60 ML/MIN/1.73M2
GLUCOSE BLD-MCNC: 138 MG/DL (ref 75–110)
GLUCOSE BLD-MCNC: 166 MG/DL (ref 75–110)
GLUCOSE BLD-MCNC: 189 MG/DL (ref 75–110)
GLUCOSE SERPL-MCNC: 120 MG/DL (ref 70–99)
HCT VFR BLD AUTO: 38.3 % (ref 41–53)
HGB BLD-MCNC: 13 G/DL (ref 13.5–17.5)
LYMPHOCYTES NFR BLD: 2.8 K/UL (ref 1–4.8)
LYMPHOCYTES RELATIVE PERCENT: 35 % (ref 24–44)
MCH RBC QN AUTO: 30.7 PG (ref 26–34)
MCHC RBC AUTO-ENTMCNC: 33.8 G/DL (ref 31–37)
MCV RBC AUTO: 90.7 FL (ref 80–100)
MONOCYTES NFR BLD: 0.9 K/UL (ref 0.1–1.2)
MONOCYTES NFR BLD: 12 % (ref 2–11)
NEUTROPHILS NFR BLD: 49 % (ref 36–66)
NEUTS SEG NFR BLD: 4.1 K/UL (ref 1.8–7.7)
PLATELET # BLD AUTO: 309 K/UL (ref 140–450)
PMV BLD AUTO: 7.3 FL (ref 6–12)
POTASSIUM SERPL-SCNC: 4.4 MMOL/L (ref 3.7–5.3)
RBC # BLD AUTO: 4.22 M/UL (ref 4.5–5.9)
SODIUM SERPL-SCNC: 136 MMOL/L (ref 135–144)
WBC OTHER # BLD: 8.2 K/UL (ref 3.5–11)

## 2023-09-10 PROCEDURE — 36415 COLL VENOUS BLD VENIPUNCTURE: CPT

## 2023-09-10 PROCEDURE — 97530 THERAPEUTIC ACTIVITIES: CPT

## 2023-09-10 PROCEDURE — 99232 SBSQ HOSP IP/OBS MODERATE 35: CPT | Performed by: PSYCHIATRY & NEUROLOGY

## 2023-09-10 PROCEDURE — 6370000000 HC RX 637 (ALT 250 FOR IP): Performed by: STUDENT IN AN ORGANIZED HEALTH CARE EDUCATION/TRAINING PROGRAM

## 2023-09-10 PROCEDURE — 85025 COMPLETE CBC W/AUTO DIFF WBC: CPT

## 2023-09-10 PROCEDURE — 80048 BASIC METABOLIC PNL TOTAL CA: CPT

## 2023-09-10 PROCEDURE — 97110 THERAPEUTIC EXERCISES: CPT

## 2023-09-10 PROCEDURE — 82947 ASSAY GLUCOSE BLOOD QUANT: CPT

## 2023-09-10 PROCEDURE — 2580000003 HC RX 258: Performed by: STUDENT IN AN ORGANIZED HEALTH CARE EDUCATION/TRAINING PROGRAM

## 2023-09-10 PROCEDURE — 1200000000 HC SEMI PRIVATE

## 2023-09-10 PROCEDURE — 99232 SBSQ HOSP IP/OBS MODERATE 35: CPT | Performed by: STUDENT IN AN ORGANIZED HEALTH CARE EDUCATION/TRAINING PROGRAM

## 2023-09-10 RX ORDER — CYCLOBENZAPRINE HCL 10 MG
10 TABLET ORAL 3 TIMES DAILY PRN
Status: DISCONTINUED | OUTPATIENT
Start: 2023-09-10 | End: 2023-09-19

## 2023-09-10 RX ADMIN — GABAPENTIN 600 MG: 300 CAPSULE ORAL at 09:06

## 2023-09-10 RX ADMIN — CYCLOBENZAPRINE 10 MG: 10 TABLET, FILM COATED ORAL at 21:57

## 2023-09-10 RX ADMIN — APIXABAN 5 MG: 5 TABLET, FILM COATED ORAL at 09:06

## 2023-09-10 RX ADMIN — ALOGLIPTIN 12.5 MG: 12.5 TABLET, FILM COATED ORAL at 09:08

## 2023-09-10 RX ADMIN — ASPIRIN 81 MG: 81 TABLET, COATED ORAL at 09:06

## 2023-09-10 RX ADMIN — OXYCODONE AND ACETAMINOPHEN 2 TABLET: 325; 5 TABLET ORAL at 01:38

## 2023-09-10 RX ADMIN — INSULIN GLARGINE 10 UNITS: 100 INJECTION, SOLUTION SUBCUTANEOUS at 21:51

## 2023-09-10 RX ADMIN — SODIUM CHLORIDE, PRESERVATIVE FREE 10 ML: 5 INJECTION INTRAVENOUS at 21:52

## 2023-09-10 RX ADMIN — ATORVASTATIN CALCIUM 80 MG: 40 TABLET, FILM COATED ORAL at 09:06

## 2023-09-10 RX ADMIN — GABAPENTIN 600 MG: 300 CAPSULE ORAL at 21:52

## 2023-09-10 RX ADMIN — OXYCODONE AND ACETAMINOPHEN 2 TABLET: 325; 5 TABLET ORAL at 18:18

## 2023-09-10 RX ADMIN — SODIUM CHLORIDE, PRESERVATIVE FREE 10 ML: 5 INJECTION INTRAVENOUS at 08:30

## 2023-09-10 RX ADMIN — CYCLOBENZAPRINE 5 MG: 10 TABLET, FILM COATED ORAL at 01:39

## 2023-09-10 RX ADMIN — CYCLOBENZAPRINE 10 MG: 10 TABLET, FILM COATED ORAL at 12:46

## 2023-09-10 RX ADMIN — OXYCODONE AND ACETAMINOPHEN 2 TABLET: 325; 5 TABLET ORAL at 09:06

## 2023-09-10 ASSESSMENT — PAIN DESCRIPTION - LOCATION
LOCATION: OTHER (COMMENT);COCCYX
LOCATION: OTHER (COMMENT)
LOCATION: LEG

## 2023-09-10 ASSESSMENT — PAIN DESCRIPTION - ONSET: ONSET: ON-GOING

## 2023-09-10 ASSESSMENT — PAIN DESCRIPTION - FREQUENCY: FREQUENCY: CONTINUOUS

## 2023-09-10 ASSESSMENT — PAIN DESCRIPTION - DESCRIPTORS
DESCRIPTORS: STABBING;DISCOMFORT
DESCRIPTORS: ACHING;SPASM;TENDER

## 2023-09-10 ASSESSMENT — PAIN DESCRIPTION - ORIENTATION
ORIENTATION: RIGHT
ORIENTATION: RIGHT

## 2023-09-10 ASSESSMENT — PAIN SCALES - GENERAL
PAINLEVEL_OUTOF10: 6
PAINLEVEL_OUTOF10: 7
PAINLEVEL_OUTOF10: 7

## 2023-09-10 ASSESSMENT — PAIN - FUNCTIONAL ASSESSMENT: PAIN_FUNCTIONAL_ASSESSMENT: ACTIVITIES ARE NOT PREVENTED

## 2023-09-10 NOTE — PROGRESS NOTES
Physical Therapy  Facility/Department: [facility] PROGRESSIVE CARE  Physical Therapy Daily Treatment Note       Name: Severo Knife  : 1950  MRN: [id number]  Date of Service: 9/10/2023    Discharge Recommendations:  Patient would benefit from continued therapy after discharge          Patient Diagnosis(es): The primary encounter diagnosis was Elevated troponin. A diagnosis of Generalized weakness was also pertinent to this visit. Past Medical History:  has a past medical history of CAD (coronary artery disease), Falls, Myocardial infarction ([address]), PVD (peripheral vascular disease) ([address]), and Urinary retention. Past Surgical History:  has a past surgical history that includes Leg amputation below knee (Right) and Cardiac surgery. Assessment   Body Structures, Functions, Activity Limitations Requiring Skilled Therapeutic Intervention: Decreased functional mobility ; Decreased ROM; Decreased strength;Decreased safe awareness;Decreased endurance  Assessment: Pt required max x 2 for supine to and from sit with dependent of 2 for scooting. Pt sat edge of bed for 20 minutes with CGA with intermittent mod of one. Edenilson LE stretches with emphasis on deep tendon pressure for right hamstrings. Attempted partial sit to stands max of 2. Pt not appropriate to return to independent living. Pt will benefit from continued PT for strengthening, balance, endurance, stretching and functional mobility training while in the hospital and at discharge. Requires PT Follow-Up: Yes  Activity Tolerance  Activity Tolerance: Patient limited by pain; Patient limited by endurance  Activity Tolerance Comments: R residual limb muscle spasms in sitting position, lessened when returned to supine     Plan   Physcial Therapy Plan  General Plan: Other (See Comment) (5-6x/wk)  Current Treatment Recommendations: Strengthening, ROM, Balance training, Functional mobility training, Transfer training, Endurance training, Wheelchair mobility

## 2023-09-10 NOTE — PROGRESS NOTES
Occupational Therapy  Facility/Department: [facility] PROGRESSIVE CARE  Occupational Therapy Daily Treatment Note    Name: Little Tan  : 1950  MRN: [id number]  Date of Service: 9/10/2023    Discharge Recommendations:  Patient would benefit from continued therapy after discharge  OT Equipment Recommendations  Other: continue to assess       Patient Diagnosis(es): The primary encounter diagnosis was Elevated troponin. A diagnosis of Generalized weakness was also pertinent to this visit. Past Medical History:  has a past medical history of CAD (coronary artery disease), Falls, Myocardial infarction ([address]), PVD (peripheral vascular disease) ([address]), and Urinary retention. Past Surgical History:  has a past surgical history that includes Leg amputation below knee (Right) and Cardiac surgery. Assessment   Performance deficits / Impairments: Decreased functional mobility ; Decreased safe awareness;Decreased balance;Decreased coordination;Decreased ROM; Decreased endurance;Decreased ADL status; Decreased strength  Assessment: Pt progressin slowly towards STGs. Patient continues to demonstrate above deficits with balance, strength, tolerance being the main issues that limit pt skill. Pt states that a little over a month ago he was Indep from chair level with self care and family has assisted with home skills. Has been in the hospital and SNF within the last month and was a 2 assist with SPT and assisted with ADLs. Would benefit from OT here and at discharge to maximize participation in self care. Pt unsafe to return to prior living situation. Prognosis: Fair  REQUIRES OT FOLLOW-UP: Yes  Activity Tolerance  Activity Tolerance: Patient Tolerated treatment well  Activity Tolerance Comments: Pt tolerated sitting EOB x20 min for B LE/UE activties/stretching/exercises and balance tasks        Plan   Occupational Therapy Plan  Times Per Week: 5-6x/wk  Times Per Day:  Once a day  Current Treatment

## 2023-09-10 NOTE — PROGRESS NOTES
Active problem Cervical myelopathy . 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 Right BKA. The condition is he has debby seen by Dr Umesh Thomson in evaluation for cervical decompression . He is alert and oriented strength bilateral arms 4-/5 . Left leg 4-/5 . Right leg BKA  with decreased fine motor movement bilaterally. MRI cervical spine with severe spinal stenosis C3-4 and C4-5 with myelomalacia . Concern of bone marrow replacement C3 and C4 possible metastatic disease  . 82 yo male with weakness . He reports that over the past 2 months to have generalized weakness that has increased over the past one month to the point where he has trouble lifting arms above head with decreased fine motor with decreased  bilaterally . He had right BKA in October and has been nonambulatory since then not having leg prosthesis . With increased weakness he is also having greater weakness of left leg with trouble supporting weight limiting ability to transfer . There has been numbness of bilateral arms along with left foot . He does report mild neck pain . Head CT with left temporal parietal along with right parietal encephalomalacia . CTA head and neck left V2 severe stenosis versus occlusion . MRI of Head with old right temporal parietal and old right parietal infarction with bilateral chronic periventricular small vessel ischemia. He has DVT on eliquis 5 mg po bid along with CAD on aspirin 81 po qd and lipitor 80 mg po qd . He has right BKA , falling  PVD . UA is normal. There is right lung nodule that has not been biopsied . Significant medications  eliquis 5 mg po bid,  aspirin 81 po qd , lipitor 80 mg po qd. Testing  . Head CT with left temporal parietal along with right parietal encephalomalacia . CTA head and neck left V2 severe stenosis versus occlusion. MRI of Head with old right temporal parietal and old right parietal infarction with bilateral chronic periventricular small vessel ischemia.  MRI cervical spine with severe spina stenosis

## 2023-09-10 NOTE — PROGRESS NOTES
Discussed the fact that pt has not had a BM since admission. Pt states he had one the AM of 9/7/23 and denies need for intervention at this time. Informed pt that a laxative is available if he changes his mind. Will continue to monitor.

## 2023-09-11 ENCOUNTER — APPOINTMENT (OUTPATIENT)
Dept: NUCLEAR MEDICINE | Age: 73
End: 2023-09-11
Payer: COMMERCIAL

## 2023-09-11 PROBLEM — Z51.5 ENCOUNTER FOR PALLIATIVE CARE: Status: ACTIVE | Noted: 2023-09-11

## 2023-09-11 PROBLEM — R77.8 ELEVATED TROPONIN: Status: ACTIVE | Noted: 2023-09-11

## 2023-09-11 PROBLEM — R79.89 ELEVATED TROPONIN: Status: ACTIVE | Noted: 2023-09-11

## 2023-09-11 PROBLEM — Z71.89 GOALS OF CARE, COUNSELING/DISCUSSION: Status: ACTIVE | Noted: 2023-09-11

## 2023-09-11 PROBLEM — Z71.89 ACP (ADVANCE CARE PLANNING): Status: ACTIVE | Noted: 2023-09-11

## 2023-09-11 PROBLEM — R52 PAIN: Status: ACTIVE | Noted: 2023-09-11

## 2023-09-11 LAB
GLUCOSE BLD-MCNC: 124 MG/DL (ref 75–110)
GLUCOSE BLD-MCNC: 137 MG/DL (ref 75–110)
GLUCOSE BLD-MCNC: 157 MG/DL (ref 75–110)
GLUCOSE BLD-MCNC: 168 MG/DL (ref 75–110)
PSA SERPL-MCNC: 0.8 NG/ML

## 2023-09-11 PROCEDURE — 3430000000 HC RX DIAGNOSTIC RADIOPHARMACEUTICAL: Performed by: PSYCHIATRY & NEUROLOGY

## 2023-09-11 PROCEDURE — 36415 COLL VENOUS BLD VENIPUNCTURE: CPT

## 2023-09-11 PROCEDURE — 99497 ADVNCD CARE PLAN 30 MIN: CPT | Performed by: NURSE PRACTITIONER

## 2023-09-11 PROCEDURE — 2580000003 HC RX 258: Performed by: PSYCHIATRY & NEUROLOGY

## 2023-09-11 PROCEDURE — 99232 SBSQ HOSP IP/OBS MODERATE 35: CPT | Performed by: STUDENT IN AN ORGANIZED HEALTH CARE EDUCATION/TRAINING PROGRAM

## 2023-09-11 PROCEDURE — A9503 TC99M MEDRONATE: HCPCS | Performed by: PSYCHIATRY & NEUROLOGY

## 2023-09-11 PROCEDURE — 1200000000 HC SEMI PRIVATE

## 2023-09-11 PROCEDURE — 78306 BONE IMAGING WHOLE BODY: CPT | Performed by: PSYCHIATRY & NEUROLOGY

## 2023-09-11 PROCEDURE — 84153 ASSAY OF PSA TOTAL: CPT

## 2023-09-11 PROCEDURE — 51701 INSERT BLADDER CATHETER: CPT

## 2023-09-11 PROCEDURE — 2580000003 HC RX 258: Performed by: STUDENT IN AN ORGANIZED HEALTH CARE EDUCATION/TRAINING PROGRAM

## 2023-09-11 PROCEDURE — 82947 ASSAY GLUCOSE BLOOD QUANT: CPT

## 2023-09-11 PROCEDURE — 99213 OFFICE O/P EST LOW 20 MIN: CPT

## 2023-09-11 PROCEDURE — 97110 THERAPEUTIC EXERCISES: CPT

## 2023-09-11 PROCEDURE — 99222 1ST HOSP IP/OBS MODERATE 55: CPT | Performed by: NURSE PRACTITIONER

## 2023-09-11 PROCEDURE — 97535 SELF CARE MNGMENT TRAINING: CPT

## 2023-09-11 PROCEDURE — 51798 US URINE CAPACITY MEASURE: CPT

## 2023-09-11 PROCEDURE — 99223 1ST HOSP IP/OBS HIGH 75: CPT | Performed by: INTERNAL MEDICINE

## 2023-09-11 PROCEDURE — 6370000000 HC RX 637 (ALT 250 FOR IP): Performed by: STUDENT IN AN ORGANIZED HEALTH CARE EDUCATION/TRAINING PROGRAM

## 2023-09-11 RX ORDER — AMINOPHYLLINE DIHYDRATE 25 MG/ML
50 INJECTION, SOLUTION INTRAVENOUS PRN
Status: CANCELLED | OUTPATIENT
Start: 2023-09-11 | End: 2023-09-11

## 2023-09-11 RX ORDER — SODIUM CHLORIDE 9 MG/ML
500 INJECTION, SOLUTION INTRAVENOUS CONTINUOUS PRN
Status: CANCELLED | OUTPATIENT
Start: 2023-09-11 | End: 2023-09-11

## 2023-09-11 RX ORDER — ALBUTEROL SULFATE 90 UG/1
2 AEROSOL, METERED RESPIRATORY (INHALATION) PRN
Status: CANCELLED | OUTPATIENT
Start: 2023-09-11 | End: 2023-09-11

## 2023-09-11 RX ORDER — ATROPINE SULFATE 0.1 MG/ML
0.5 INJECTION INTRAVENOUS EVERY 5 MIN PRN
Status: CANCELLED | OUTPATIENT
Start: 2023-09-11 | End: 2023-09-11

## 2023-09-11 RX ORDER — SODIUM CHLORIDE 0.9 % (FLUSH) 0.9 %
10 SYRINGE (ML) INJECTION ONCE
Status: COMPLETED | OUTPATIENT
Start: 2023-09-11 | End: 2023-09-11

## 2023-09-11 RX ORDER — NITROGLYCERIN 0.4 MG/1
0.4 TABLET SUBLINGUAL EVERY 5 MIN PRN
Status: CANCELLED | OUTPATIENT
Start: 2023-09-11 | End: 2023-09-11

## 2023-09-11 RX ORDER — SODIUM CHLORIDE 0.9 % (FLUSH) 0.9 %
5-40 SYRINGE (ML) INJECTION PRN
Status: CANCELLED | OUTPATIENT
Start: 2023-09-11 | End: 2023-09-11

## 2023-09-11 RX ORDER — REGADENOSON 0.08 MG/ML
0.4 INJECTION, SOLUTION INTRAVENOUS
Status: CANCELLED | OUTPATIENT
Start: 2023-09-11

## 2023-09-11 RX ORDER — TC 99M MEDRONATE 20 MG/10ML
25 INJECTION, POWDER, LYOPHILIZED, FOR SOLUTION INTRAVENOUS
Status: COMPLETED | OUTPATIENT
Start: 2023-09-11 | End: 2023-09-11

## 2023-09-11 RX ORDER — METOPROLOL TARTRATE 5 MG/5ML
5 INJECTION INTRAVENOUS EVERY 5 MIN PRN
Status: CANCELLED | OUTPATIENT
Start: 2023-09-11 | End: 2023-09-11

## 2023-09-11 RX ADMIN — ALOGLIPTIN 12.5 MG: 12.5 TABLET, FILM COATED ORAL at 09:49

## 2023-09-11 RX ADMIN — ASPIRIN 81 MG: 81 TABLET, COATED ORAL at 09:49

## 2023-09-11 RX ADMIN — SODIUM CHLORIDE, PRESERVATIVE FREE 10 ML: 5 INJECTION INTRAVENOUS at 08:32

## 2023-09-11 RX ADMIN — OXYCODONE AND ACETAMINOPHEN 2 TABLET: 325; 5 TABLET ORAL at 02:03

## 2023-09-11 RX ADMIN — OXYCODONE AND ACETAMINOPHEN 2 TABLET: 325; 5 TABLET ORAL at 21:22

## 2023-09-11 RX ADMIN — SODIUM CHLORIDE, PRESERVATIVE FREE 10 ML: 5 INJECTION INTRAVENOUS at 21:23

## 2023-09-11 RX ADMIN — TC 99M MEDRONATE 26.3 MILLICURIE: 20 INJECTION, POWDER, LYOPHILIZED, FOR SOLUTION INTRAVENOUS at 08:30

## 2023-09-11 RX ADMIN — CYCLOBENZAPRINE 10 MG: 10 TABLET, FILM COATED ORAL at 16:47

## 2023-09-11 RX ADMIN — TAMSULOSIN HYDROCHLORIDE 0.4 MG: 0.4 CAPSULE ORAL at 09:49

## 2023-09-11 RX ADMIN — ATORVASTATIN CALCIUM 80 MG: 40 TABLET, FILM COATED ORAL at 09:48

## 2023-09-11 RX ADMIN — SODIUM CHLORIDE, PRESERVATIVE FREE 10 ML: 5 INJECTION INTRAVENOUS at 09:48

## 2023-09-11 RX ADMIN — GABAPENTIN 600 MG: 300 CAPSULE ORAL at 09:49

## 2023-09-11 RX ADMIN — PANTOPRAZOLE SODIUM 40 MG: 40 TABLET, DELAYED RELEASE ORAL at 05:17

## 2023-09-11 RX ADMIN — INSULIN GLARGINE 10 UNITS: 100 INJECTION, SOLUTION SUBCUTANEOUS at 21:23

## 2023-09-11 RX ADMIN — CYCLOBENZAPRINE 10 MG: 10 TABLET, FILM COATED ORAL at 10:48

## 2023-09-11 RX ADMIN — GABAPENTIN 600 MG: 300 CAPSULE ORAL at 21:22

## 2023-09-11 ASSESSMENT — PAIN SCALES - WONG BAKER
WONGBAKER_NUMERICALRESPONSE: 0
WONGBAKER_NUMERICALRESPONSE: 0

## 2023-09-11 ASSESSMENT — PAIN DESCRIPTION - ORIENTATION
ORIENTATION: RIGHT
ORIENTATION: RIGHT

## 2023-09-11 ASSESSMENT — PAIN DESCRIPTION - DESCRIPTORS
DESCRIPTORS: ACHING
DESCRIPTORS: SHARP
DESCRIPTORS: SORE;SPASM
DESCRIPTORS: SPASM

## 2023-09-11 ASSESSMENT — PAIN SCALES - GENERAL
PAINLEVEL_OUTOF10: 9
PAINLEVEL_OUTOF10: 7
PAINLEVEL_OUTOF10: 7
PAINLEVEL_OUTOF10: 0
PAINLEVEL_OUTOF10: 7

## 2023-09-11 ASSESSMENT — PAIN DESCRIPTION - LOCATION
LOCATION: LEG

## 2023-09-11 ASSESSMENT — PAIN - FUNCTIONAL ASSESSMENT
PAIN_FUNCTIONAL_ASSESSMENT: ACTIVITIES ARE NOT PREVENTED

## 2023-09-11 NOTE — ACP (ADVANCE CARE PLANNING)
Advance Care Planning     Advance Care Planning (ACP) Physician/NP/PA Conversation    Date of Conversation: 2023  Conducted with: Patient with Decision Making Capacity    Healthcare Decision Maker:  Patient is  and does not have any biological children. His parents are  and he has one sister Bouchra Bernabe. Patient has not completed [facility] POA paperwork and his sister Bouchra Bernabe is his primary decision maker per [place]. Care Preferences:    Hospitalization: \"If your health worsens and it becomes clear that your chance of recovery is unlikely, what would be your preference regarding hospitalization? \"  Hospitalized     Ventilation: \"If you were unable to breath on your own and your chance of recovery was unlikely, what would be your preference about the use of a ventilator (breathing machine) if it was available to you? \"  Full code     Resuscitation: \"In the event your heart stopped as a result of an underlying serious health condition, would you want attempts made to restart your heart, or would you prefer a natural death? \"  Full code         Conversation Outcomes / Follow-Up Plan:    Palliative Interaction:  I went to bedside and introduced myself and my palliative care role. Patient is  and does not have any biological children. His parents are  and he has one sister Bouchra Bernabe. Patient has not completed [facility] [employer] POA paperwork and his sister Bouchra Bernabe is his primary decision maker per [place]. We discussed code status levels and I explained each level completely and patient states he wants to be full code status. Patient is on Norco and Flexeril and states this keeping him comfortable.       Palliative care will continue to follow patient and provide him with updates     Length of Voluntary ACP Conversation in minutes:  16 minutes    TYRONE Dasilva - JONATHAN

## 2023-09-11 NOTE — PROGRESS NOTES
Physical Therapy  Facility/Department: [facility] PROGRESSIVE CARE  Physical Therapy Daily Treatment Note    Name: Maurizio Rangel  : 1950  MRN: [id number]  Date of Service: 2023    Discharge Recommendations:  Patient would benefit from continued therapy after discharge          Patient Diagnosis(es): The primary encounter diagnosis was Elevated troponin. A diagnosis of Generalized weakness was also pertinent to this visit. Past Medical History:  has a past medical history of CAD (coronary artery disease), Falls, Myocardial infarction ([address]), PVD (peripheral vascular disease) ([address]), and Urinary retention. Past Surgical History:  has a past surgical history that includes Leg amputation below knee (Right) and Cardiac surgery. Assessment   Body Structures, Functions, Activity Limitations Requiring Skilled Therapeutic Intervention: Decreased functional mobility ; Decreased ROM; Decreased strength;Decreased safe awareness;Decreased endurance    Assessment: Pt with planned decompressive lami this week. Performed LLE exs and RLE residual limb stretches including left sidelying allowing right hip extension. Pt not appropriate to return to independent living. Pt will benefit from continued PT for strengthening, balance, endurance, stretching and functional mobility training while in the hospital and at discharge.     Requires PT Follow-Up: Yes  Activity Tolerance  Activity Tolerance: Patient limited by pain;Treatment limited secondary to decreased cognition     Plan   Physcial Therapy Plan  General Plan: 3-5 times per week (5-6x/wk)  Current Treatment Recommendations: Strengthening, ROM, Balance training, Functional mobility training, Transfer training, Endurance training, Wheelchair mobility training, Neuromuscular re-education, Therapeutic activities, Home exercise program, Safety education & training  Safety Devices  Type of Devices: Bed alarm in place, Call light within reach, Gait belt, Patient at risk

## 2023-09-11 NOTE — PROGRESS NOTES
Occupational [address]  Occupational Therapy Not Seen Note    DATE: 2023    NAME: Kelsie Monk  MRN: [id number]   : 1950      Patient not seen this date for Occupational Therapy due to:    Testing: Pt currently off floor for bone scan.  Will continue to pursue as able    Next Scheduled Treatment:  pm. Or     Electronically signed by Clementeen Spurling on 2023 at 11:55 AM

## 2023-09-11 NOTE — CONSULTS
Palliative Care Inpatient Consult    NAME:  Geraldine Dodson RECORD NUMBER:  [id number]  AGE: 81 y.o. GENDER: male  : 1950  TODAY'S DATE:  2023    Reasons for Consultation:    Goals of care   Code status discussion   Family support   Symptom management     Members of PC team contributing to this consultation are :  Emerald Cunningham CNP  Palliative care     Summary   Patient is a full code   Patient living at [facility] presently   Patient awaiting cardiac workup for possible Laminectomy  Patient sister Jennifer Dejesus is patient primary decision maker per [place] law     Plan      Palliative Interaction:  I went to bedside and introduced myself and my palliative care role. Patient is  and does not have any biological children. His parents are  and he has one sister Jenniferedgardo Dejesus. Patient has not completed [facility] [employer] paperwork and his sister Jennifer Dejesus is his primary decision maker per [place]. We discussed code status levels and I explained each level completely and patient states he wants to be full code status. Patient is on Norco and Flexeril and states this keeping him comfortable.      Palliative care will continue to follow patient and provide him with updates     Education/support to family  Education/support to patient  Discharge planning/helping to coordinate care  Communications with primary service  Pharmacologic pain management  Providing support for coping/adaptation/distress of family  Providing support for coping/adaptation/distress of patient  Discussing meaning/purpose   Caregiver support/education  Continue with current plan of care  Code status clarified: Full Code  Principle Problem/Diagnosis:  Altered mental status    Additional Assessments:   Principal Problem:    Altered mental status  Active Problems:    Generalized weakness    Below knee amputation (HCC)    CAD (coronary artery disease), native coronary artery    Cigarette smoker    Diabetes mellitus type 2 in questions and follows commands patient with generalized weakness   Head: Normocephalic and atraumatic. Eyes: EOM are normal. Pupils are equal, round   Neck: Normal range of motion. Neck supple. No tracheal deviation present. Cardiovascular: Normal rate and regular rhythm, S1, S2, no murmur   Pulmonary/Chest: Lungs diminished respirations relaxed   Abdomen: Soft. No tenderness, not distended, no ascites, no organomegaly   Musculoskeletal: generalized weakness and discomfort   Neurological: Alert and oriented x3 answers questions and follows commands patient with generalized weakness   Skin: Sacral wound     Palliative Performance Scale:  ___60%  Ambulation reduced; Significant disease; Can't do hobbies/housework; intake normal or reduced; occasional assist; LOC full/confusion  ___50%  Mainly sit/lie; Extensive disease; Can't do any work; Considerable assist; intake normal or reduced; LOC full/confusion  _x__40%  Mainly in bed; Extensive disease; Mainly assist; intake normal or reduced; LOC full/confusion   ___30%  Bed Bound; Extensive disease; Total care; intake reduced; LOCfull/confusion  ___20%  Bed Bound; Extensive disease; Total care; intake minimal; Drowsy/coma  ___10%  Bed Bound; Extensive disease; Total care; Mouth care only; Drowsy/coma  ___0       Death        Thank you for allowing Palliative Care to participate in the care of Mr. Thomas . This note has been dictated by dragon, typing errors may be a possibility. The total time I spent in seeing the patient, discussing goals of care, advanced directives, code status and other major issues was more than 60 minutes      Electronically signed by   TYRONE Medina NP  Palliative Care Team  on 9/11/2023 at 8:50 AM    [facility] Number [phone]    [address] [facility] Number [phone]    [address] [place] [address] Number [phone]    Please call with any palliative questions or concerns.   Palliative Care Team is
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
## 2023-09-11 NOTE — PROGRESS NOTES
[facility]  PROGRESS NOTE    Room # 308/308-01   Name: Tila Ngo               Reason for visit: Routine    I visited the patient. Admit Date & Time: 9/7/2023 10:46 AM    Assessment:  Tila Ngo is a 81 y.o. male in the hospital because \"numbness\". Upon entering the room patient was lying in bed. Patient shared with this writer that he did not feel up to a visit at this time. Patient asked this writer to \"stop by again tomorrow\" Patient appeared to be calm and coping      Intervention:  I introduced myself and my title as  I offered space for patient  to express feelings, needs, and concerns and provided a ministry presence. This writer engaged in brief conversation with patient. This writer actively listened to patient. Outcome:  Patient expressed gratitude to this writer for visiting     Plan:  Chaplains will remain available to offer spiritual and emotional support as needed.     Electronically signed by Kate Vasquez on 9/11/2023 at 1:14 PM.  [address] [place] [address] Drive       09/11/23 1313   Encounter Summary   Encounter Overview/Reason  Initial Encounter   Service Provided For: Patient   [facility]   NeuroVigil System Spouse   Last Encounter  09/11/23   Complexity of Encounter Low   Begin Time 1240   End Time  1245   Total Time Calculated 5 min   Spiritual/Emotional needs   Type Spiritual Support   Assessment/Intervention/Outcome   Assessment Calm;Coping   Intervention Active listening;Sustaining Presence/Ministry of presence   Outcome Engaged in conversation;Coping;Expressed feelings, needs, and concerns   Plan and Referrals   Plan/Referrals Continue Support (comment)

## 2023-09-11 NOTE — PLAN OF CARE
Problem: Discharge Planning  Goal: Discharge to home or other facility with appropriate resources  9/11/2023 1251 by Jean Osman RN  Outcome: Progressing   Patient actively participates in ADLs and decision making regarding plan of care. Problem: Safety - Adult  Goal: Free from fall injury  9/11/2023 1251 by Jean Osman RN  Outcome: Progressing   No falls/injuries this shift, bed in lowest position, brakes on, bed alarm on, call light in reach, side rails up x2. Problem: Skin/Tissue Integrity  Goal: Absence of new skin breakdown  Description: 1. Monitor for areas of redness and/or skin breakdown  2. Assess vascular access sites hourly  3. Every 4-6 hours minimum:  Change oxygen saturation probe site  4. Every 4-6 hours:  If on nasal continuous positive airway pressure, respiratory therapy assess nares and determine need for appliance change or resting period. 9/11/2023 1251 by Jean Osman RN  Outcome: Progressing   No new skin breakdown noted, no new signs/symptoms of infection, continue to monitor lab work including WBC, medications administered per physician orders.

## 2023-09-11 NOTE — PROGRESS NOTES
[facility]  Office: [address], DO, Carin Olguin, DO, Darrick Essex, DO, Deana Pulido Blood, DO, Viola Moran MD, Bay Castellon MD, Deon Diego MD, Chilango Cardenas MD,  Nita Jaime MD, Manoj Hopkins MD, Dontae Morfin, DO, Rosa Echavarria MD,  Aleisha Sommers MD, Damaris Hardin MD, Jose Antonio Smith DO, Salima Hills MD,  Rae De Leon, DO, Alivia Anthony MD, Jennifer Negron MD, Jyoti Ram MD, Shannan Rincon MD,  Murray Fountain MD, Daniella Brown MD, Robyn Mclaughlin MD, Wesley Hernández DO, Titus Rosales MD,  Jade Rosales MD, Korin Rodriguez, CNP,  Jah Jonas, CNP,, Yariel Martinez, CNP,  Mariela Valle, [facility], Desean Pastor, CNP, Karla Villasenor, CNP, Lillian Borden, CNP, Yaw Villagran, CNP, Amy Greenberg, CNP, Lito Fierro, CNP, Victor Manuel Zuleta PA-C, Alaina King, PORSCHE, Jean Paul Srivastava, LANE, Debbie Griffin, [address] [place] [address]    Progress Note    9/11/2023    9:08 AM    Name:   Cecilio Valerio  MRN:     [id number]     Acct:      [de-identified]   Room:   [address] [place] [address] Day:  4  Admit Date:  9/7/2023 10:46 AM    PCP:   PROVIDER UNKNOWN  Code Status:  Full Code    Subjective:     Patient was seen and examined at bedside this AM. He reports feeling comfortable and has no specific complaints. The patient states that he is eager to have surgery. Orthopedic surgery following; plan for decompressive laminectomy this admission. Medications: Allergies:     Allergies   Allergen Reactions    Ciprofloxacin        Current Meds:   Scheduled Meds:    insulin glargine  10 Units SubCUTAneous Nightly    sodium chloride  80 mL IntraVENous Once    alogliptin  12.5 mg Oral Daily    [Held by provider] apixaban  5 mg Oral BID    aspirin  81 mg Oral Daily    atorvastatin  80 mg Oral Daily    pantoprazole  40 mg Oral QAM AC    tamsulosin  0.4 mg Oral Daily    sodium chloride flush  5-40 mL IntraVENous 2 times per day

## 2023-09-11 NOTE — PROGRESS NOTES
R77.8    Pain R52    Goals of care, counseling/discussion Z71.89    Encounter for palliative care Z51.5    ACP (advance care planning) Z71.89       Measurements:     09/11/23 1045   Wound 09/07/23 Coccyx Mid Open area   Date First Assessed/Time First Assessed: 09/07/23 2028   Present on Hospital Admission: Yes  Primary Wound Type: (c) Other (comment)  Location: Coccyx  Wound Location Orientation: Mid  Wound Description (Comments): Open area   Wound Image    Wound Etiology Other  (chronic \"cyst\")   Dressing Status New dressing applied   Wound Cleansed Cleansed with saline   Dressing/Treatment Alginate; Foam   Dressing Change Due 09/13/23   Wound Length (cm) 0.4 cm   Wound Width (cm) 0.4 cm   Wound Depth (cm) 0.4 cm   Wound Surface Area (cm^2) 0.16 cm^2   Wound Volume (cm^3) 0.064 cm^3   Wound Assessment Pink/red   Drainage Amount Scant (moist but unmeasurable)   Drainage Description Serosanguinous   Melissa-wound Assessment Maceration       Periwound tissue maceration noted. Piece of alginate placed over wound under silicone bordered foam.  No purulence or induration noted; no signs of infection. Wound is larger than patient thought it was. Response to treatment:  Well tolerated by patient. Comfort glide placed to aid in lateral movement and repositioning. Plan   Plan of Care:     COCCYGEAL WOUND:  Sacral foam dressing to sacrococcygeal area. Place small piece of Maxorb alginate over open wound. Change every 2 days and as needed if loose or soiled. [x]Turn and reposition every 2 hours while in bed. [x] Float left heel off of bed with pillows under calves. [] Heel protective boots (heel medix boots) at all times while in bed. [] Apply zinc oxide cream twice daily and as needed after incontinent episodes. [x] Perform routine incontinence care with use of foam cleanser. [x] Use single layer moisture wicking underpad. [x] Use comfort glide system and wedges to reposition patient.

## 2023-09-11 NOTE — PLAN OF CARE
Conditions and Co-morbidities  Goal: Patient's chronic conditions and co-morbidity symptoms are monitored and maintained or improved  Outcome: Progressing  Flowsheets (Taken 9/10/2023 2000)  Care Plan - Patient's Chronic Conditions and Co-Morbidity Symptoms are Monitored and Maintained or Improved:   Monitor and assess patient's chronic conditions and comorbid symptoms for stability, deterioration, or improvement   Collaborate with multidisciplinary team to address chronic and comorbid conditions and prevent exacerbation or deterioration

## 2023-09-12 ENCOUNTER — ANESTHESIA EVENT (OUTPATIENT)
Dept: OPERATING ROOM | Age: 73
End: 2023-09-12
Payer: COMMERCIAL

## 2023-09-12 PROBLEM — M48.02 CERVICAL STENOSIS OF SPINAL CANAL: Status: ACTIVE | Noted: 2023-09-12

## 2023-09-12 LAB
GLUCOSE BLD-MCNC: 113 MG/DL (ref 75–110)
GLUCOSE BLD-MCNC: 127 MG/DL (ref 75–110)
GLUCOSE BLD-MCNC: 140 MG/DL (ref 75–110)
GLUCOSE BLD-MCNC: 169 MG/DL (ref 75–110)

## 2023-09-12 PROCEDURE — 99232 SBSQ HOSP IP/OBS MODERATE 35: CPT | Performed by: INTERNAL MEDICINE

## 2023-09-12 PROCEDURE — 82947 ASSAY GLUCOSE BLOOD QUANT: CPT

## 2023-09-12 PROCEDURE — 99254 IP/OBS CNSLTJ NEW/EST MOD 60: CPT | Performed by: NURSE PRACTITIONER

## 2023-09-12 PROCEDURE — 97535 SELF CARE MNGMENT TRAINING: CPT

## 2023-09-12 PROCEDURE — 1200000000 HC SEMI PRIVATE

## 2023-09-12 PROCEDURE — 6370000000 HC RX 637 (ALT 250 FOR IP): Performed by: STUDENT IN AN ORGANIZED HEALTH CARE EDUCATION/TRAINING PROGRAM

## 2023-09-12 PROCEDURE — 99232 SBSQ HOSP IP/OBS MODERATE 35: CPT | Performed by: HOSPITALIST

## 2023-09-12 PROCEDURE — 97530 THERAPEUTIC ACTIVITIES: CPT

## 2023-09-12 PROCEDURE — 99233 SBSQ HOSP IP/OBS HIGH 50: CPT | Performed by: NURSE PRACTITIONER

## 2023-09-12 PROCEDURE — 97110 THERAPEUTIC EXERCISES: CPT

## 2023-09-12 PROCEDURE — 2580000003 HC RX 258: Performed by: STUDENT IN AN ORGANIZED HEALTH CARE EDUCATION/TRAINING PROGRAM

## 2023-09-12 RX ADMIN — SODIUM CHLORIDE, PRESERVATIVE FREE 10 ML: 5 INJECTION INTRAVENOUS at 21:18

## 2023-09-12 RX ADMIN — GABAPENTIN 600 MG: 300 CAPSULE ORAL at 07:55

## 2023-09-12 RX ADMIN — OXYCODONE AND ACETAMINOPHEN 2 TABLET: 325; 5 TABLET ORAL at 21:15

## 2023-09-12 RX ADMIN — INSULIN GLARGINE 10 UNITS: 100 INJECTION, SOLUTION SUBCUTANEOUS at 22:18

## 2023-09-12 RX ADMIN — CYCLOBENZAPRINE 10 MG: 10 TABLET, FILM COATED ORAL at 21:15

## 2023-09-12 RX ADMIN — GABAPENTIN 600 MG: 300 CAPSULE ORAL at 21:15

## 2023-09-12 RX ADMIN — ATORVASTATIN CALCIUM 80 MG: 40 TABLET, FILM COATED ORAL at 07:54

## 2023-09-12 RX ADMIN — SODIUM CHLORIDE, PRESERVATIVE FREE 10 ML: 5 INJECTION INTRAVENOUS at 07:58

## 2023-09-12 RX ADMIN — ALOGLIPTIN 12.5 MG: 12.5 TABLET, FILM COATED ORAL at 07:54

## 2023-09-12 RX ADMIN — CYCLOBENZAPRINE 10 MG: 10 TABLET, FILM COATED ORAL at 07:17

## 2023-09-12 ASSESSMENT — PAIN DESCRIPTION - ORIENTATION
ORIENTATION: RIGHT;LEFT
ORIENTATION: RIGHT

## 2023-09-12 ASSESSMENT — PAIN SCALES - GENERAL
PAINLEVEL_OUTOF10: 2
PAINLEVEL_OUTOF10: 8
PAINLEVEL_OUTOF10: 5

## 2023-09-12 ASSESSMENT — PAIN DESCRIPTION - LOCATION
LOCATION: LEG;KNEE
LOCATION: KNEE;LEG

## 2023-09-12 ASSESSMENT — PAIN SCALES - WONG BAKER: WONGBAKER_NUMERICALRESPONSE: 0

## 2023-09-12 ASSESSMENT — PAIN DESCRIPTION - DESCRIPTORS: DESCRIPTORS: ACHING

## 2023-09-12 ASSESSMENT — PAIN - FUNCTIONAL ASSESSMENT: PAIN_FUNCTIONAL_ASSESSMENT: ACTIVITIES ARE NOT PREVENTED

## 2023-09-12 NOTE — PLAN OF CARE
Problem: Discharge Planning  Goal: Discharge to home or other facility with appropriate resources  Outcome: Progressing  Flowsheets (Taken 9/12/2023 0601)  Discharge to home or other facility with appropriate resources:   Identify barriers to discharge with patient and caregiver   Arrange for needed discharge resources and transportation as appropriate   Identify discharge learning needs (meds, wound care, etc)   Refer to discharge planning if patient needs post-hospital services based on physician order or complex needs related to functional status, cognitive ability or social support system     Problem: Safety - Adult  Goal: Free from fall injury  Outcome: Progressing  Flowsheets (Taken 9/12/2023 0601)  Free From Fall Injury:   Instruct family/caregiver on patient safety   Based on caregiver fall risk screen, instruct family/caregiver to ask for assistance with transferring infant if caregiver noted to have fall risk factors     Problem: Skin/Tissue Integrity  Goal: Absence of new skin breakdown  Description: 1. Monitor for areas of redness and/or skin breakdown  2. Assess vascular access sites hourly  3. Every 4-6 hours minimum:  Change oxygen saturation probe site  4. Every 4-6 hours:  If on nasal continuous positive airway pressure, respiratory therapy assess nares and determine need for appliance change or resting period.   Outcome: Progressing     Problem: Pain  Goal: Verbalizes/displays adequate comfort level or baseline comfort level  Outcome: Progressing  Flowsheets (Taken 9/12/2023 0601)  Verbalizes/displays adequate comfort level or baseline comfort level:   Encourage patient to monitor pain and request assistance   Assess pain using appropriate pain scale   Administer analgesics based on type and severity of pain and evaluate response   Implement non-pharmacological measures as appropriate and evaluate response   Notify Licensed Independent Practitioner if interventions unsuccessful or patient

## 2023-09-12 NOTE — PROGRESS NOTES
[facility]  Office: [phone]  Naila Wong DO, Gricelda Blanchard DO, Christi Rodriguez DO, Caity Lagos MD, Osiris Mcfadden MD, Melba Lezama MD, Keri Miller MD,  Catherine Ramsay MD, Francisca Fuller MD, Hanna Madison DO, Jovanna Flores MD,  Josué Hollis MD, Julia Proctor MD, Ana Rosa Berkowitz DO, Lenore Kaur MD,  Earnest Sol DO, Leonardo Aguayo MD, Krupa Tam MD, Claudia Sahu MD, Eusebio Steve MD,  Eliane Finley MD, Kina Guillory MD, Farzana Valerio MD, Romi Contreras MD, Buster Santoro DO, Camila Lester MD,  Alina Mcwilliams MD, Hung Burr MD, Devora Shannon, CNP,  Sara Lockwood, CNP,, José Miguel Patel, CNP,  Angelo Zamudio, [facility], Param Longoria, CNP, Leila Roman, CNP, Vangie Fitzgerald, CNP, Joon Izquierdo, CNP, Neri Altamirano, CNP, Shaye Giles, CNP, Jeremiah Morin, CNS, Lashon Royal, CNP, Shane Garcia, [address] [place] [address]    Progress Note    9/12/2023    12:43 PM    Name:   Wendy Olmedo  MRN:     [id number]     Acct:      [de-identified]   Room:   [address] [place] [address] Day:  5  Admit Date:  9/7/2023 10:46 AM    PCP:   PROVIDER UNKNOWN  Code Status:  Full Code    Subjective:     Patient seen in follow-up for confusion and generalized weakness, patient states \"I am having muscle spasms\"    Chart reviewed, entries noted. Case discussed with cardiology. Given his known ischemic cardiomyopathy he is to undergo stress testing prior to restratification for his cervical decompression. Tentative plans are for surgical intervention on his cervical stenosis on Thursday. He will undergo stress testing tomorrow. The patient denies any questions or concerns at this point in time. His biggest complaint is that of muscle spasms. I did review his medications with him and he does have Flexeril ordered should he require it.   He understands that this is an as needed drug and that he has to ask for

## 2023-09-12 NOTE — PROGRESS NOTES
Occupational Therapy  Facility/Department: [facility] PROGRESSIVE CARE  Occupational Therapy Daily Treatment Note    Name: Gretchen Escudero  : 1950  MRN: [id number]  Date of Service: 2023    Chief Complaint   Patient presents with    Numbness     Pt from [facility] via EMS with c/o extremity N x 4 since yesterday at 4am.  Pt also c/o having speech issues yesterday and not feeling \"mentally\" right. Decided to come to ED this am (30 hours later) due to numbness in upper and lower limbs. Pt is A/O, cooperative, speech clear. Discharge Recommendations:  Patient would benefit from continued therapy after discharge    Patient Diagnosis(es): The primary encounter diagnosis was Elevated troponin. A diagnosis of Generalized weakness was also pertinent to this visit. Past Medical History:  has a past medical history of CAD (coronary artery disease), Falls, Myocardial infarction ([address]), PVD (peripheral vascular disease) ([address]), and Urinary retention. Past Surgical History:  has a past surgical history that includes Leg amputation below knee (Right) and Cardiac surgery. Assessment   Performance deficits / Impairments: Decreased functional mobility ; Decreased safe awareness;Decreased balance;Decreased coordination;Decreased ROM; Decreased endurance;Decreased ADL status; Decreased strength    Assessment: Pt progressing slowly towards STGs. Pt seen by OT/PT to support improved participation in activities. Pt MOD-MAX A x2 for bed mobility, sat EOB for ~10 mins (PT working on seated balance training while OT simultaneously working on ADL retraining (grooming)); Pt completed 1 set of 10 repetitions of grasping built up utensil with B hands (visual and verbal cues for technique). Pt to benefit from continued therapy during hospitalization due to functional deficits listed above. Pt would benefit from continued therapy following discharge.     Prognosis: Fair  REQUIRES OT FOLLOW-UP: Yes  Activity Maximum assistance  Rolling to Right: Maximum assistance  Supine to Sit: Maximum assistance;2 Person assistance (log roll)  Sit to Supine: 2 Person assistance;Maximum assistance (log roll)  Scooting: Dependent/Total;2 Person assistance  Bed Mobility Comments: Pt sat on EOB x 10 minutes. Emphasis on sitting balance, A to maintain balance; tends to lean to the L side, cues given on what is upright. PT worked on sitting balance retraining while OT worked on [employer] ADL retraining. Cognition  Overall Cognitive Status: Exceptions  Arousal/Alertness: Delayed responses to stimuli  Following Commands:  Follows multistep commands consistently  Attention Span: Attends with cues to redirect  Memory: Decreased short term memory  Safety Judgement: Decreased awareness of need for assistance;Decreased awareness of need for safety  Problem Solving: Assistance required to generate solutions;Assistance required to identify errors made;Assistance required to implement solutions;Assistance required to correct errors made  Insights: Decreased awareness of deficits  Initiation: Requires cues for some  Sequencing: Requires cues for some  Orientation  Overall Orientation Status: Within Normal Limits  Orientation Level: Oriented X4    Education Given To: Patient  Education Provided: Transfer Training;ADL Adaptive Strategies  Education Method: Verbal;Demonstration  Barriers to Learning: None  Education Outcome: Continued education needed;Verbalized understanding    AM-PAC Score  AM-[facility] Inpatient Daily Activity Raw Score: 7 (09/12/23 1551)  AM-PAC Inpatient ADL T-Scale Score : 20.13 (09/12/23 1551)  ADL Inpatient CMS 0-100% Score: 92.44 (09/12/23 1551)  ADL Inpatient CMS G-Code Modifier : CM (09/12/23 1551)    Goals  Short Term Goals  Time Frame for Short Term Goals: 14 days pt will  Short Term Goal 1: ADL sitting balance to F+  Short Term Goal 2: Grooming supported sit with min assist and least restrictive AE/AD  Short Term Goal 3: Complete

## 2023-09-12 NOTE — PROGRESS NOTES
[facility]   Progress Note                   Date:   9/12/2023  Patient name: Vanessa Espino  Date of admission:  9/7/2023 10:46 AM  MRN:   [id number]  YOB: 1950  PCP: PROVIDER UNKNOWN    Reason for Admission: Altered mental status [R41.82]  Generalized weakness [R53.1]  Elevated troponin [R77.8]    Subjective:       Clinical Changes / Abnormalities: Pt seen and examined in the room. Pt denies any CP or sob. Medications:   Scheduled Meds:   insulin glargine  10 Units SubCUTAneous Nightly    sodium chloride  80 mL IntraVENous Once    alogliptin  12.5 mg Oral Daily    [Held by provider] apixaban  5 mg Oral BID    [Held by provider] aspirin  81 mg Oral Daily    atorvastatin  80 mg Oral Daily    pantoprazole  40 mg Oral QAM AC    tamsulosin  0.4 mg Oral Daily    sodium chloride flush  5-40 mL IntraVENous 2 times per day    gabapentin  600 mg Oral BID    insulin lispro  0-8 Units SubCUTAneous TID WC    insulin lispro  0-4 Units SubCUTAneous Nightly     Continuous Infusions:   sodium chloride      dextrose       CBC:   Recent Labs     09/10/23  0711   WBC 8.2   HGB 13.0*        BMP:    Recent Labs     09/10/23  0711      K 4.4      CO2 28   BUN 25*   CREATININE 0.9   GLUCOSE 120*     Hepatic: No results for input(s): \"AST\", \"ALT\", \"ALB\", \"BILITOT\", \"ALKPHOS\" in the last 72 hours. Troponin:   Recent Labs     09/09/23  0845   TROPHS 39*     BNP: No results for input(s): \"BNP\" in the last 72 hours. Lipids: No results for input(s): \"CHOL\", \"HDL\" in the last 72 hours. Invalid input(s): \"LDLCALCU\"  INR: No results for input(s): \"INR\" in the last 72 hours. DATA:    Diagnostics:    EKG: Normal sinus rhythm  Septal infarct , age undetermined  Abnormal ECG  ECHO:9/7/23  EF 27-56%  Grade 1 Diastolic  No LV thrombus  Stress Test:   Cardiac Angiography: 2013   CAD (coronary artery disease), native coronary artery 04/27/2013   1.  One vessel coronary artery disease

## 2023-09-12 NOTE — PROGRESS NOTES
_                         Today's Date: 9/12/2023  Patient Name: Cecilio Valerio  Date of admission: 9/7/2023 10:46 AM  Patient's age: 81 y. o., 1950  Admission Dx: Altered mental status [R41.82]  Generalized weakness [R53.1]  Elevated troponin [R77.8]      Requesting Physician: Damaris Hardin MD    CHIEF COMPLAINT: Generalized weakness and fatigue. Failure to thrive. Progressive weakness of both arms. Lung mass. Pulm metastasis. INTERIM HISTORY  The patient is seen and evaluated continues to complain muscle spasms in the neck area. Surgery is tentatively planned for Thursday pending cardiac clearance. HISTORY OF PRESENT ILLNESS:      The patient is a 81 y.o.  male who is admitted to the hospital for further management of altered mental status with worsening weakness and fatigue. He had multiple other findings including elevated troponin and abnormal imaging. The patient was recently diagnosed with lung mass based on screening CT scan due to history of smoking. He smoked average of 2 to 3 packs/day for more than 50 years. Recently down to about 12 to 15 cigarettes daily. CT scan showed suspicious mass in the lung and he was scheduled for biopsy at the [facility] in [facility]. It was not done yet. Patient had increasing weakness of both arms and hands over the last several months. He was using wheelchair and recently had difficulty operating and using upper extremities. 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 The patient had evaluation with MRI of the cervical spine which showed severe canal stenosis. He was evaluated by orthopedics with plan for possible intervention and surgery. Patient denies any chest pain. No cough, sputum or hemoptysis. No headaches. No other complaints.     Past Medical History:   has a past medical history of CAD (coronary artery disease), Falls, Myocardial Goals of care, counseling/discussion [Z71.89] 09/11/2023    Encounter for palliative care [Z51.5] 09/11/2023    ACP (advance care planning) [Z71.89] 09/11/2023    Cervical myelopathy ([address]) [G95.9] 09/08/2023    Cerebral multi-infarct state [I69.30] 09/08/2023    Altered mental status [R41.82] 09/07/2023    Generalized weakness [R53.1] 09/07/2023    Below knee amputation [facility]) [S88.119A] 08/04/2023    Cigarette smoker [F17.210] 08/04/2023    Lung mass [R91.8] 08/04/2023    Peripheral arterial disease ([address]) [I73.9] 08/04/2023    CAD (coronary artery disease), native coronary artery [I25.10] 04/27/2013    Diabetes mellitus type 2 in obese ([address]) [E11.69, E66.9] 05/06/2009       RECOMMENDATIONS:  Records and labs and images were reviewed and discussed with the patient. Clinically he is having poor performance especially with progressive weakness of the upper extremities and being quite dependent on the upper extremities for his ambulation and daily activities & needs. He has right below-knee amputation. Explained the results of the imaging with the MRI and the bone scan. Findings are consistent with extensive bone metastasis likely from primary lung cancer. Patient had previous imaging showing lung mass and he had plan for biopsy at the [facility].  Explained the need for tissue diagnosis. Patient will have surgery for the severe spinal stenosis and cervical spine so possible biopsy during the procedure which may identify the primary malignancy. If no biopsy was done from the neck surgery, he will then have CT-guided needle biopsy of lung mass. I agree with surgical plan for Thursday pending cardiac clearance  Further systemic treatment will be based on final pathology results. PSA 0.8 making the possibility of prostate cancer extremely unlikely    Patient's questions were answered to the best of his satisfaction and he verbalized full understanding and agreement.   Thank you for allowing us to participate

## 2023-09-12 NOTE — FLOWSHEET NOTE
[address] [facility] NOTE    Room # 308/308-01   Name: Kelsie Monk            Temple: Unknown     Reason for visit:  Palliative Care    I visited the patient. Admit Date & Time: 9/7/2023 10:46 AM    Assessment:  Kelsie Monk is a 81 y.o. male in the hospital. Upon entering the room Writer observed Patient appeared to be sleeping. As writer was offering a silent prayer, Pt opened his eyes. Patient smiled and greeted Ricardo Altamirano. PCT arrived to tend to Patient's need, which Pt expressed. Pt voiced an additional need when PCT left which writer attempted to address. Pt shared how he was doing and did not have any needs at this time. Pt closed his eyes and appeared to be fatigued. Pt shared that he thinks he feels others' prayers. Pt thanked writer for her prayer. Patient responded to writer's question about his name, sharing that he received his name from the Bible figure Enrique Juarez. Intervention:  I introduced myself and my title as . Writer inquired about Pt's coping and needs. Writer offered words of support and encouragement. Writer asked if Pt feels the prayers of others. Writer assured Pt of her prayers. Writer asked Pt about his name. Outcome:  Patient thanked Ricardo Altamirano. Plan:  Chaplains will remain available to offer spiritual and emotional support as needed.      09/12/23 1111   Encounter Summary   Service Provided For: Patient   Referral/Consult From: [employer] Unknown   Last Encounter  09/12/23   Complexity of Encounter Low   Begin Time 1050   End Time  1100   Total Time Calculated 10 min   Spiritual/Emotional needs   Type Spiritual Support   Assessment/Intervention/Outcome   Assessment Calm;Coping   Intervention Active listening;Explored/Affirmed feelings, thoughts, concerns;Explored Coping Skills/Resources;Sustaining Presence/Ministry of presence   Outcome Expressed feelings, needs, and concerns;Expressed Gratitude;Coping   Plan and

## 2023-09-12 NOTE — CONSULTS
2013   CAD (coronary artery disease), native coronary artery 04/27/2013   1. One vessel coronary artery disease with acute thrombotic occlusion LAD, culprit for anterior MI   2. Moderate left ventricular dysfunction LVEF 30-35% with anterior wall hypokinesis apical focal akinesis   3. LVEDP 29   4. Successful PTCA, Aspiration thrombectomy, and placement of a Xience Xpedition 3X18 mm Drug Eluting Stent Mid LAD with excellent angiographic results  5. EF: 30-35% with anterior wall and apical hypokinesis    Labs:     CBC:   Recent Labs     09/10/23  0711   WBC 8.2   HGB 13.0*   HCT 38.3*          BMP:   Recent Labs     09/10/23  0711      K 4.4   CO2 28   BUN 25*   CREATININE 0.9   LABGLOM >60   GLUCOSE 120*       BNP: No results for input(s): \"BNP\" in the last 72 hours. PT/INR: No results for input(s): \"PROTIME\", \"INR\" in the last 72 hours. APTT:No results for input(s): \"APTT\" in the last 72 hours. CARDIAC ENZYMES:No results for input(s): \"CKTOTAL\", \"CKMB\", \"CKMBINDEX\", \"TROPONINI\" in the last 72 hours. FASTING LIPID PANEL:No results found for: \"HDL\", \"LDLDIRECT\", \"LDLCALC\", \"TRIG\"  LIVER PROFILE:No results for input(s): \"AST\", \"ALT\", \"LABALBU\" in the last 72 hours. IMPRESSION:    Patient Active Problem List   Diagnosis    Altered mental status    Generalized weakness    Below knee amputation (HCC)    CAD (coronary artery disease), native coronary artery    Cigarette smoker    Diabetes mellitus type 2 in obese (HCC)    Lung mass    Peripheral arterial disease (HCC)    Cervical myelopathy (HCC)    Cerebral multi-infarct state    Elevated troponin    Pain    Goals of care, counseling/discussion    Encounter for palliative care    ACP (advance care planning)       RECOMMENDATIONS:  Known CMP 35-40%, No signs of fluid overload. Remote hx of CAD, 2013- STEMI to LAD. No further CAD known. Minimally elevated trops, trending down.  Due to hx and no further workup since and hx of smoking and dm2, will need to proceed with cardiac stress test in the am prior to surgical clearance. No known hx of afib. On eliquis for DVT hx.       Discussed with patient and Nurse.   Geo Velazquez [facility]           [phone]

## 2023-09-13 LAB
GLUCOSE BLD-MCNC: 107 MG/DL (ref 75–110)
GLUCOSE BLD-MCNC: 108 MG/DL (ref 75–110)
GLUCOSE BLD-MCNC: 155 MG/DL (ref 75–110)
GLUCOSE BLD-MCNC: 91 MG/DL (ref 75–110)

## 2023-09-13 PROCEDURE — 6370000000 HC RX 637 (ALT 250 FOR IP): Performed by: STUDENT IN AN ORGANIZED HEALTH CARE EDUCATION/TRAINING PROGRAM

## 2023-09-13 PROCEDURE — 99232 SBSQ HOSP IP/OBS MODERATE 35: CPT | Performed by: INTERNAL MEDICINE

## 2023-09-13 PROCEDURE — 97110 THERAPEUTIC EXERCISES: CPT

## 2023-09-13 PROCEDURE — 99232 SBSQ HOSP IP/OBS MODERATE 35: CPT | Performed by: HOSPITALIST

## 2023-09-13 PROCEDURE — 1200000000 HC SEMI PRIVATE

## 2023-09-13 PROCEDURE — 97530 THERAPEUTIC ACTIVITIES: CPT

## 2023-09-13 PROCEDURE — 82947 ASSAY GLUCOSE BLOOD QUANT: CPT

## 2023-09-13 PROCEDURE — 2580000003 HC RX 258: Performed by: STUDENT IN AN ORGANIZED HEALTH CARE EDUCATION/TRAINING PROGRAM

## 2023-09-13 RX ADMIN — CYCLOBENZAPRINE 10 MG: 10 TABLET, FILM COATED ORAL at 10:49

## 2023-09-13 RX ADMIN — ATORVASTATIN CALCIUM 80 MG: 40 TABLET, FILM COATED ORAL at 22:27

## 2023-09-13 RX ADMIN — GABAPENTIN 600 MG: 300 CAPSULE ORAL at 22:27

## 2023-09-13 RX ADMIN — GABAPENTIN 600 MG: 300 CAPSULE ORAL at 10:49

## 2023-09-13 RX ADMIN — OXYCODONE AND ACETAMINOPHEN 2 TABLET: 325; 5 TABLET ORAL at 10:49

## 2023-09-13 RX ADMIN — SODIUM CHLORIDE, PRESERVATIVE FREE 10 ML: 5 INJECTION INTRAVENOUS at 10:51

## 2023-09-13 RX ADMIN — CYCLOBENZAPRINE 10 MG: 10 TABLET, FILM COATED ORAL at 22:28

## 2023-09-13 RX ADMIN — OXYCODONE AND ACETAMINOPHEN 2 TABLET: 325; 5 TABLET ORAL at 14:50

## 2023-09-13 RX ADMIN — SODIUM CHLORIDE, PRESERVATIVE FREE 10 ML: 5 INJECTION INTRAVENOUS at 22:28

## 2023-09-13 RX ADMIN — ALOGLIPTIN 12.5 MG: 12.5 TABLET, FILM COATED ORAL at 10:49

## 2023-09-13 RX ADMIN — INSULIN GLARGINE 10 UNITS: 100 INJECTION, SOLUTION SUBCUTANEOUS at 22:28

## 2023-09-13 ASSESSMENT — PAIN DESCRIPTION - ORIENTATION
ORIENTATION: LEFT;LOWER
ORIENTATION: LOWER;LEFT

## 2023-09-13 ASSESSMENT — PAIN SCALES - GENERAL
PAINLEVEL_OUTOF10: 7
PAINLEVEL_OUTOF10: 8

## 2023-09-13 ASSESSMENT — ENCOUNTER SYMPTOMS: SHORTNESS OF BREATH: 0

## 2023-09-13 ASSESSMENT — PAIN DESCRIPTION - LOCATION
LOCATION: BACK;HIP
LOCATION: BACK;HIP

## 2023-09-13 ASSESSMENT — PAIN DESCRIPTION - DESCRIPTORS: DESCRIPTORS: ACHING

## 2023-09-13 ASSESSMENT — PAIN - FUNCTIONAL ASSESSMENT: PAIN_FUNCTIONAL_ASSESSMENT: ACTIVITIES ARE NOT PREVENTED

## 2023-09-13 NOTE — PLAN OF CARE
Problem: Discharge Planning  Goal: Discharge to home or other facility with appropriate resources  Outcome: Progressing     Problem: Safety - Adult  Goal: Free from fall injury  Outcome: Progressing     Problem: Skin/Tissue Integrity  Goal: Absence of new skin breakdown  Description: 1. Monitor for areas of redness and/or skin breakdown  2. Assess vascular access sites hourly  3. Every 4-6 hours minimum:  Change oxygen saturation probe site  4. Every 4-6 hours:  If on nasal continuous positive airway pressure, respiratory therapy assess nares and determine need for appliance change or resting period.   Outcome: Progressing     Problem: Metabolic/Fluid and Electrolytes - Adult  Goal: Electrolytes maintained within normal limits  Outcome: Progressing  Goal: Hemodynamic stability and optimal renal function maintained  Outcome: Progressing     Problem: Chronic Conditions and Co-morbidities  Goal: Patient's chronic conditions and co-morbidity symptoms are monitored and maintained or improved  Outcome: Progressing     Problem: Pain  Goal: Verbalizes/displays adequate comfort level or baseline comfort level  Outcome: Progressing     Problem: ABCDS Injury Assessment  Goal: Absence of physical injury  Outcome: Progressing

## 2023-09-13 NOTE — PROGRESS NOTES
_                         Today's Date: 9/13/2023  Patient Name: Nishant Fraser  Date of admission: 9/7/2023 10:46 AM  Patient's age: 81 y. o., 1950  Admission Dx: Altered mental status [R41.82]  Generalized weakness [R53.1]  Elevated troponin [R77.8]      Requesting Physician: Lindy Browning MD    CHIEF COMPLAINT: Generalized weakness and fatigue. Failure to thrive. Progressive weakness of both arms. Lung mass. Pulm metastasis. INTERIM HISTORY  The patient is seen and evaluated   Labs and chart reviewed  Stress test cancelled for today   C/o back spasms  Denies fever or chills     HISTORY OF PRESENT ILLNESS:      The patient is a 81 y.o.  male who is admitted to the hospital for further management of altered mental status with worsening weakness and fatigue. He had multiple other findings including elevated troponin and abnormal imaging. The patient was recently diagnosed with lung mass based on screening CT scan due to history of smoking. He smoked average of 2 to 3 packs/day for more than 50 years. Recently down to about 12 to 15 cigarettes daily. CT scan showed suspicious mass in the lung and he was scheduled for biopsy at the [facility] in [facility]. It was not done yet. Patient had increasing weakness of both arms and hands over the last several months. He was using wheelchair and recently had difficulty operating and using upper extremities. 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 The patient had evaluation with MRI of the cervical spine which showed severe canal stenosis. He was evaluated by orthopedics with plan for possible intervention and surgery. Patient denies any chest pain. No cough, sputum or hemoptysis. No headaches. No other complaints.     Past Medical History:   has a past medical history of CAD (coronary artery disease), Falls, Myocardial infarction ([address]), PVD

## 2023-09-13 NOTE — PROGRESS NOTES
[facility]  PROGRESS NOTE    Room # 308/308-01   Name: Tara Wright              Reason for visit: Follow up    I visited the patient. Admit Date & Time: 9/7/2023 10:46 AM    Assessment:  Tara Wright is a 81 y.o. male in the hospital because \"numbness\". Upon entering the room patient was lying in bed. Patient's call light was on. Patient stated that he would \"like to be moved\" in his bed. Patient did not express any other needs or concerns to this writer. Patient appeared to be calm and coping and tired. Intervention:  I introduced myself and my title as  I offered space for patient  to express feelings, needs, and concerns and provided a ministry presence. This writer found patient's nurse and nurses and nurses aide and they came into patient's room to attend to patient. After nurses attended to patient his writer returned to patients room and he appeared to be sleeping. This writer offered brief silent prayer for pt. Outcome:  Patient expressed gratitude to this writer for locating pt's nurse to assist him. Plan:  Chaplains will remain available to offer spiritual and emotional support as needed. Electronically signed by Cynthia Begum, on 9/13/2023 at 11:20 AM.  [address] [facility] [address]        09/13/23 1116   Encounter Summary   Encounter Overview/Reason  Follow-up   Service Provided For: Patient   Referral/Consult From: Palliative Care   Support System Unknown   Last Encounter  09/13/23   Complexity of Encounter Low   Begin Time 1040   End Time  1045   Total Time Calculated 5 min   Spiritual/Emotional needs   Type Spiritual Support   Assessment/Intervention/Outcome   Assessment Calm;Coping   Intervention Sustaining Presence/Ministry of presence; Active listening   Outcome Expressed feelings, needs, and concerns;Engaged in conversation;Expressed Gratitude

## 2023-09-13 NOTE — PROGRESS NOTES
Physical Therapy  Facility/Department: [facility] CARE  Physical Therapy Daily Treatment Note    Name: Sara Mcgovern  : 1950  MRN: [id number]  Date of Service: 2023    Discharge Recommendations:  Patient would benefit from continued therapy after discharge          Patient Diagnosis(es): The primary encounter diagnosis was Elevated troponin. A diagnosis of Generalized weakness was also pertinent to this visit. Past Medical History:  has a past medical history of CAD (coronary artery disease), Falls, Myocardial infarction ([address]), PVD (peripheral vascular disease) ([address]), and Urinary retention. Past Surgical History:  has a past surgical history that includes Leg amputation below knee (Right) and Cardiac surgery. Assessment   Body Structures, Functions, Activity Limitations Requiring Skilled Therapeutic Intervention: Decreased functional mobility ; Decreased ROM; Decreased strength;Decreased safe awareness;Decreased endurance  Assessment: Pt with planned stress test today and cervical surgery tomorrow. Pt required max x 2 for supine to and from sit. Pt sat on side of bed for ~25 minutes with at most mod assist of one during LE seated exs. Performed LE stretching and ROM supine, seated and in left sidelying. Pt not appropriate to return to independent living. Pt will benefit from continued PT for strengthening, balance, endurance, stretching and functional mobility training while in the hospital and at discharge.   Requires PT Follow-Up: Yes  Activity Tolerance  Activity Tolerance: Patient limited by pain     Plan   Physcial Therapy Plan  General Plan: 3-5 times per week  Current Treatment Recommendations: Strengthening, ROM, Balance training, Functional mobility training, Transfer training, Endurance training, Wheelchair mobility training, Neuromuscular re-education, Therapeutic activities, Home exercise program, Safety education & training  Safety Devices  Type of Devices: Bed alarm in assist. Pt able to obtain full right knee extension without spasms or resistance while in left sidelying    Postural Correction Exercises: While OT performing seated UE exs, this PT noted increase winging of right scapula and attempted to physically improve axial contact during OT's exercises        OutComes Score                                                  AM-PAC Score  AM-PAC Inpatient Mobility Raw Score : 6 (09/13/23 1339)  AM-PAC Inpatient T-Scale Score : 23.55 (09/13/23 1339)  Mobility Inpatient CMS 0-100% Score: 100 (09/13/23 1339)  Mobility Inpatient CMS G-Code Modifier : CN (09/13/23 1339)               Goals  Short Term Goals  Time Frame for Short Term Goals: 14 days  Short Term Goal 1: Progress bed mobility to min A x 1. Short Term Goal 2: Progress standing with [employer] to mod A x 1. Short Term Goal 3: Pt to tolerate mild stretching to R residual limb and improve ROM extension by at least 15 deg. Short Term Goal 4: Pt to have at least 4-/5 to 4/5 BLE muscle strength. Short Term Goal 5: Pt to propel w/c x 50 ft, SBA.        Education  Patient Education  Education Given To: Patient  Education Provided: Role of Therapy;Plan of Care;Home Exercise Program  Education Method: Verbal;Demonstration  Barriers to Learning:  (pt easily distracted)  Education Outcome: Verbalized understanding;Continued education needed;Demonstrated understanding      Therapy Time   Individual Concurrent Group Co-treatment   Time In 1133         Time Out 1214         Minutes 41         Timed Code Treatment Minutes: [address], PT

## 2023-09-13 NOTE — PROGRESS NOTES
Occupational Therapy  Facility/Department: [facility] PROGRESSIVE CARE  Occupational Therapy Daily Treatment Note    Name: Earnest Link  : 1950  MRN: [id number]  Date of Service: 2023    Chief Complaint   Patient presents with    Numbness     Pt from [facility] via EMS with c/o extremity N x 4 since yesterday at 4am.  Pt also c/o having speech issues yesterday and not feeling \"mentally\" right. Decided to come to ED this am (30 hours later) due to numbness in upper and lower limbs. Pt is A/O, cooperative, speech clear. Discharge Recommendations:  Patient would benefit from continued therapy after discharge    Patient Diagnosis(es): The primary encounter diagnosis was Elevated troponin. A diagnosis of Generalized weakness was also pertinent to this visit. Past Medical History:  has a past medical history of CAD (coronary artery disease), Falls, Myocardial infarction ([address]), PVD (peripheral vascular disease) ([address]), and Urinary retention. Past Surgical History:  has a past surgical history that includes Leg amputation below knee (Right) and Cardiac surgery. Assessment   Performance deficits / Impairments: Decreased functional mobility ; Decreased safe awareness;Decreased balance;Decreased coordination;Decreased ROM; Decreased endurance;Decreased ADL status; Decreased strength    Assessment: Pt progressing slowly towards STGs. Pt seen by OT/PT to support improved participation in [address] [place] [address] activities. Pt MAX A x2 for supine>sitting EOB; Pt sat EOB for ~25 mins with OT/PT. PT working on seated balance and core strength while OT working on BUE strengthening (grasp and B elbow strength) and functional reach. OT/PT then switching and OT working on seated balance and posture while PT working on BLE stretching/strengthening; pt presents with winging in B scapulas more prominent in R during BUE activities; MOD A x2 for sit>supine.  Pt declined need for ADLs this AM. Pt MAX A x1 for rolling in

## 2023-09-14 ENCOUNTER — APPOINTMENT (OUTPATIENT)
Dept: GENERAL RADIOLOGY | Age: 73
End: 2023-09-14
Payer: COMMERCIAL

## 2023-09-14 ENCOUNTER — ANESTHESIA (OUTPATIENT)
Dept: OPERATING ROOM | Age: 73
End: 2023-09-14
Payer: COMMERCIAL

## 2023-09-14 LAB
GLUCOSE BLD-MCNC: 115 MG/DL (ref 75–110)
GLUCOSE BLD-MCNC: 124 MG/DL (ref 75–110)
GLUCOSE BLD-MCNC: 133 MG/DL (ref 75–110)
GLUCOSE BLD-MCNC: 149 MG/DL (ref 75–110)

## 2023-09-14 PROCEDURE — 00NW0ZZ RELEASE CERVICAL SPINAL CORD, OPEN APPROACH: ICD-10-PCS | Performed by: ORTHOPAEDIC SURGERY

## 2023-09-14 PROCEDURE — 6360000002 HC RX W HCPCS

## 2023-09-14 PROCEDURE — 2700000000 HC OXYGEN THERAPY PER DAY

## 2023-09-14 PROCEDURE — 4A11X4G MONITORING OF PERIPHERAL NERVOUS ELECTRICAL ACTIVITY, INTRAOPERATIVE, EXTERNAL APPROACH: ICD-10-PCS | Performed by: ORTHOPAEDIC SURGERY

## 2023-09-14 PROCEDURE — 7100000001 HC PACU RECOVERY - ADDTL 15 MIN: Performed by: ORTHOPAEDIC SURGERY

## 2023-09-14 PROCEDURE — 2580000003 HC RX 258: Performed by: ANESTHESIOLOGY

## 2023-09-14 PROCEDURE — 99232 SBSQ HOSP IP/OBS MODERATE 35: CPT | Performed by: HOSPITALIST

## 2023-09-14 PROCEDURE — 6360000002 HC RX W HCPCS: Performed by: CHIROPRACTOR

## 2023-09-14 PROCEDURE — 2500000003 HC RX 250 WO HCPCS: Performed by: NURSE ANESTHETIST, CERTIFIED REGISTERED

## 2023-09-14 PROCEDURE — 6370000000 HC RX 637 (ALT 250 FOR IP): Performed by: CHIROPRACTOR

## 2023-09-14 PROCEDURE — C1889 IMPLANT/INSERT DEVICE, NOC: HCPCS | Performed by: ORTHOPAEDIC SURGERY

## 2023-09-14 PROCEDURE — 2500000003 HC RX 250 WO HCPCS: Performed by: ORTHOPAEDIC SURGERY

## 2023-09-14 PROCEDURE — 2580000003 HC RX 258: Performed by: NURSE ANESTHETIST, CERTIFIED REGISTERED

## 2023-09-14 PROCEDURE — 2709999900 HC NON-CHARGEABLE SUPPLY: Performed by: ORTHOPAEDIC SURGERY

## 2023-09-14 PROCEDURE — 3700000001 HC ADD 15 MINUTES (ANESTHESIA): Performed by: ORTHOPAEDIC SURGERY

## 2023-09-14 PROCEDURE — 2580000003 HC RX 258: Performed by: CHIROPRACTOR

## 2023-09-14 PROCEDURE — 3600000012 HC SURGERY LEVEL 2 ADDTL 15MIN: Performed by: ORTHOPAEDIC SURGERY

## 2023-09-14 PROCEDURE — 99232 SBSQ HOSP IP/OBS MODERATE 35: CPT | Performed by: INTERNAL MEDICINE

## 2023-09-14 PROCEDURE — 7100000000 HC PACU RECOVERY - FIRST 15 MIN: Performed by: ORTHOPAEDIC SURGERY

## 2023-09-14 PROCEDURE — 1200000000 HC SEMI PRIVATE

## 2023-09-14 PROCEDURE — 2720000010 HC SURG SUPPLY STERILE: Performed by: ORTHOPAEDIC SURGERY

## 2023-09-14 PROCEDURE — 6370000000 HC RX 637 (ALT 250 FOR IP): Performed by: STUDENT IN AN ORGANIZED HEALTH CARE EDUCATION/TRAINING PROGRAM

## 2023-09-14 PROCEDURE — 82947 ASSAY GLUCOSE BLOOD QUANT: CPT

## 2023-09-14 PROCEDURE — 6360000002 HC RX W HCPCS: Performed by: NURSE ANESTHETIST, CERTIFIED REGISTERED

## 2023-09-14 PROCEDURE — 2580000003 HC RX 258: Performed by: ORTHOPAEDIC SURGERY

## 2023-09-14 PROCEDURE — 3700000000 HC ANESTHESIA ATTENDED CARE: Performed by: ORTHOPAEDIC SURGERY

## 2023-09-14 PROCEDURE — C1713 ANCHOR/SCREW BN/BN,TIS/BN: HCPCS | Performed by: ORTHOPAEDIC SURGERY

## 2023-09-14 PROCEDURE — 0RG207J FUSION OF 2 OR MORE CERVICAL VERTEBRAL JOINTS WITH AUTOLOGOUS TISSUE SUBSTITUTE, POSTERIOR APPROACH, ANTERIOR COLUMN, OPEN APPROACH: ICD-10-PCS | Performed by: ORTHOPAEDIC SURGERY

## 2023-09-14 PROCEDURE — C9359 IMPLNT,BON VOID FILLER-PUTTY: HCPCS | Performed by: ORTHOPAEDIC SURGERY

## 2023-09-14 PROCEDURE — 3600000002 HC SURGERY LEVEL 2 BASE: Performed by: ORTHOPAEDIC SURGERY

## 2023-09-14 DEVICE — DBM T43102 1CC GRAFTON PUTTY
Type: IMPLANTABLE DEVICE | Site: SPINE CERVICAL | Status: FUNCTIONAL
Brand: GRAFTON®AND GRAFTON PLUS®DEMINERALIZED BONE MATRIX (DBM)

## 2023-09-14 DEVICE — IMPLANTABLE DEVICE
Type: IMPLANTABLE DEVICE | Site: SPINE CERVICAL | Status: FUNCTIONAL
Brand: CAVUX CERVICAL CAGE-X

## 2023-09-14 DEVICE — THE ALLY BONE SCREW IS A SINGLE-USE IMPLANT MADE OF TITANIUM ALLOY AND USED IN BONE RECONSTRUCTION, OSTEOTOMY, ARTHRODESIS, JOIN FUSION, FRACTURE REPAIR AND FIXATION APPROPRIATE FOR THE SIZE OF THE DEVICE.
Type: IMPLANTABLE DEVICE | Site: SPINE CERVICAL | Status: FUNCTIONAL
Brand: ALLY BONE SCREW

## 2023-09-14 DEVICE — CAVUX CERVICAL CAGE IS INDICATED FOR USE IN SKELETALLY MATURE PATIENTS WITH DEGENERATIVE DISC DISEASE (DDD) OF THE CERVICAL SPINE (C3-C7) WITH ACCOMPANYING RADICULAR SYMPTOMS AT ONE DISC LEVEL.  DDD IS DEFINED AS DISCOGENIC PAIN WITH DEGENERATION OF THE DISC CONFIRMED BY PATIENT HISTORY AND RADIOGRAPHIC STUDIES.  PATIENTS SHOULD HAVE RECEIVED AT LEAST SIX WEEKS OF NON-OPERATIVE TREATMENT PRIOR TO TREATMENT WITH THE DEVICE.  DEVICES ARE INTENDED TO BE USED WITH AUTOGENOUS BONE GRAFT AND SUPPLEMENTAL FIXATION, SUCH AS AN ANTERIOR PLATING SYSTEM.
Type: IMPLANTABLE DEVICE | Site: SPINE CERVICAL | Status: FUNCTIONAL
Brand: CAVUX™ CERVICAL CAGE-B 5MM

## 2023-09-14 RX ORDER — DEXAMETHASONE SODIUM PHOSPHATE 4 MG/ML
4 INJECTION, SOLUTION INTRA-ARTICULAR; INTRALESIONAL; INTRAMUSCULAR; INTRAVENOUS; SOFT TISSUE EVERY 8 HOURS
Status: COMPLETED | OUTPATIENT
Start: 2023-09-14 | End: 2023-09-15

## 2023-09-14 RX ORDER — BUPIVACAINE HYDROCHLORIDE AND EPINEPHRINE 2.5; 5 MG/ML; UG/ML
INJECTION, SOLUTION INFILTRATION; PERINEURAL PRN
Status: DISCONTINUED | OUTPATIENT
Start: 2023-09-14 | End: 2023-09-14 | Stop reason: ALTCHOICE

## 2023-09-14 RX ORDER — KETAMINE HYDROCHLORIDE 10 MG/ML
INJECTION INTRAMUSCULAR; INTRAVENOUS PRN
Status: DISCONTINUED | OUTPATIENT
Start: 2023-09-14 | End: 2023-09-14 | Stop reason: SDUPTHER

## 2023-09-14 RX ORDER — MEPERIDINE HYDROCHLORIDE 50 MG/ML
12.5 INJECTION INTRAMUSCULAR; INTRAVENOUS; SUBCUTANEOUS ONCE
Status: DISCONTINUED | OUTPATIENT
Start: 2023-09-14 | End: 2023-09-14

## 2023-09-14 RX ORDER — OXYCODONE HYDROCHLORIDE 5 MG/1
10 TABLET ORAL PRN
Status: DISCONTINUED | OUTPATIENT
Start: 2023-09-14 | End: 2023-09-14

## 2023-09-14 RX ORDER — SODIUM CHLORIDE 0.9 % (FLUSH) 0.9 %
5-40 SYRINGE (ML) INJECTION EVERY 12 HOURS SCHEDULED
Status: DISCONTINUED | OUTPATIENT
Start: 2023-09-14 | End: 2023-09-14

## 2023-09-14 RX ORDER — PHENYLEPHRINE HCL IN 0.9% NACL 1 MG/10 ML
SYRINGE (ML) INTRAVENOUS PRN
Status: DISCONTINUED | OUTPATIENT
Start: 2023-09-14 | End: 2023-09-14 | Stop reason: SDUPTHER

## 2023-09-14 RX ORDER — SODIUM CHLORIDE 9 MG/ML
INJECTION, SOLUTION INTRAVENOUS PRN
Status: DISCONTINUED | OUTPATIENT
Start: 2023-09-14 | End: 2023-09-14

## 2023-09-14 RX ORDER — LIDOCAINE HYDROCHLORIDE 10 MG/ML
1 INJECTION, SOLUTION INFILTRATION; PERINEURAL
Status: DISCONTINUED | OUTPATIENT
Start: 2023-09-14 | End: 2023-09-14

## 2023-09-14 RX ORDER — LABETALOL HYDROCHLORIDE 5 MG/ML
10 INJECTION, SOLUTION INTRAVENOUS
Status: DISCONTINUED | OUTPATIENT
Start: 2023-09-14 | End: 2023-09-14

## 2023-09-14 RX ORDER — ONDANSETRON 2 MG/ML
INJECTION INTRAMUSCULAR; INTRAVENOUS PRN
Status: DISCONTINUED | OUTPATIENT
Start: 2023-09-14 | End: 2023-09-14 | Stop reason: SDUPTHER

## 2023-09-14 RX ORDER — OXYCODONE HYDROCHLORIDE 5 MG/1
5 TABLET ORAL PRN
Status: DISCONTINUED | OUTPATIENT
Start: 2023-09-14 | End: 2023-09-14

## 2023-09-14 RX ORDER — METOCLOPRAMIDE HYDROCHLORIDE 5 MG/ML
10 INJECTION INTRAMUSCULAR; INTRAVENOUS
Status: DISCONTINUED | OUTPATIENT
Start: 2023-09-14 | End: 2023-09-14

## 2023-09-14 RX ORDER — SODIUM CHLORIDE 0.9 % (FLUSH) 0.9 %
5-40 SYRINGE (ML) INJECTION PRN
Status: DISCONTINUED | OUTPATIENT
Start: 2023-09-14 | End: 2023-09-14

## 2023-09-14 RX ORDER — PROPOFOL 10 MG/ML
INJECTION, EMULSION INTRAVENOUS PRN
Status: DISCONTINUED | OUTPATIENT
Start: 2023-09-14 | End: 2023-09-14 | Stop reason: SDUPTHER

## 2023-09-14 RX ORDER — SODIUM CHLORIDE 9 MG/ML
INJECTION, SOLUTION INTRAVENOUS CONTINUOUS PRN
Status: DISCONTINUED | OUTPATIENT
Start: 2023-09-14 | End: 2023-09-14 | Stop reason: SDUPTHER

## 2023-09-14 RX ORDER — PROPOFOL 10 MG/ML
INJECTION, EMULSION INTRAVENOUS CONTINUOUS PRN
Status: DISCONTINUED | OUTPATIENT
Start: 2023-09-14 | End: 2023-09-14 | Stop reason: SDUPTHER

## 2023-09-14 RX ORDER — CEFAZOLIN 2 G/1
INJECTION, POWDER, FOR SOLUTION INTRAMUSCULAR; INTRAVENOUS
Status: DISPENSED
Start: 2023-09-14 | End: 2023-09-15

## 2023-09-14 RX ORDER — DIPHENHYDRAMINE HYDROCHLORIDE 50 MG/ML
12.5 INJECTION INTRAMUSCULAR; INTRAVENOUS
Status: DISCONTINUED | OUTPATIENT
Start: 2023-09-14 | End: 2023-09-14

## 2023-09-14 RX ORDER — ROCURONIUM BROMIDE 10 MG/ML
INJECTION, SOLUTION INTRAVENOUS PRN
Status: DISCONTINUED | OUTPATIENT
Start: 2023-09-14 | End: 2023-09-14 | Stop reason: SDUPTHER

## 2023-09-14 RX ORDER — FENTANYL CITRATE 50 UG/ML
INJECTION, SOLUTION INTRAMUSCULAR; INTRAVENOUS PRN
Status: DISCONTINUED | OUTPATIENT
Start: 2023-09-14 | End: 2023-09-14 | Stop reason: SDUPTHER

## 2023-09-14 RX ORDER — LIDOCAINE HYDROCHLORIDE 20 MG/ML
JELLY TOPICAL
Status: DISPENSED
Start: 2023-09-14 | End: 2023-09-15

## 2023-09-14 RX ORDER — SODIUM CHLORIDE, SODIUM LACTATE, POTASSIUM CHLORIDE, CALCIUM CHLORIDE 600; 310; 30; 20 MG/100ML; MG/100ML; MG/100ML; MG/100ML
INJECTION, SOLUTION INTRAVENOUS CONTINUOUS
Status: DISCONTINUED | OUTPATIENT
Start: 2023-09-14 | End: 2023-09-14

## 2023-09-14 RX ORDER — MORPHINE SULFATE 2 MG/ML
1 INJECTION, SOLUTION INTRAMUSCULAR; INTRAVENOUS EVERY 5 MIN PRN
Status: DISCONTINUED | OUTPATIENT
Start: 2023-09-14 | End: 2023-09-14

## 2023-09-14 RX ORDER — MIDAZOLAM HYDROCHLORIDE 2 MG/2ML
2 INJECTION, SOLUTION INTRAMUSCULAR; INTRAVENOUS
Status: DISCONTINUED | OUTPATIENT
Start: 2023-09-14 | End: 2023-09-14

## 2023-09-14 RX ORDER — MIDAZOLAM HYDROCHLORIDE 1 MG/ML
INJECTION INTRAMUSCULAR; INTRAVENOUS PRN
Status: DISCONTINUED | OUTPATIENT
Start: 2023-09-14 | End: 2023-09-14 | Stop reason: SDUPTHER

## 2023-09-14 RX ORDER — ONDANSETRON 2 MG/ML
4 INJECTION INTRAMUSCULAR; INTRAVENOUS
Status: DISCONTINUED | OUTPATIENT
Start: 2023-09-14 | End: 2023-09-14

## 2023-09-14 RX ORDER — HYDRALAZINE HYDROCHLORIDE 20 MG/ML
10 INJECTION INTRAMUSCULAR; INTRAVENOUS
Status: DISCONTINUED | OUTPATIENT
Start: 2023-09-14 | End: 2023-09-14

## 2023-09-14 RX ORDER — SODIUM CHLORIDE, SODIUM LACTATE, POTASSIUM CHLORIDE, CALCIUM CHLORIDE 600; 310; 30; 20 MG/100ML; MG/100ML; MG/100ML; MG/100ML
INJECTION, SOLUTION INTRAVENOUS CONTINUOUS PRN
Status: DISCONTINUED | OUTPATIENT
Start: 2023-09-14 | End: 2023-09-14 | Stop reason: SDUPTHER

## 2023-09-14 RX ADMIN — OXYCODONE AND ACETAMINOPHEN 2 TABLET: 325; 5 TABLET ORAL at 19:37

## 2023-09-14 RX ADMIN — Medication 0.5 MG: at 17:17

## 2023-09-14 RX ADMIN — Medication 100 MCG: at 14:07

## 2023-09-14 RX ADMIN — SODIUM CHLORIDE, POTASSIUM CHLORIDE, SODIUM LACTATE AND CALCIUM CHLORIDE: 600; 310; 30; 20 INJECTION, SOLUTION INTRAVENOUS at 13:40

## 2023-09-14 RX ADMIN — CYCLOBENZAPRINE 10 MG: 10 TABLET, FILM COATED ORAL at 18:07

## 2023-09-14 RX ADMIN — GABAPENTIN 600 MG: 300 CAPSULE ORAL at 21:06

## 2023-09-14 RX ADMIN — HYDROMORPHONE HYDROCHLORIDE 0.5 MG: 1 INJECTION, SOLUTION INTRAMUSCULAR; INTRAVENOUS; SUBCUTANEOUS at 17:17

## 2023-09-14 RX ADMIN — Medication 100 MCG: at 13:53

## 2023-09-14 RX ADMIN — Medication 200 MCG: at 13:45

## 2023-09-14 RX ADMIN — SODIUM CHLORIDE: 9 INJECTION, SOLUTION INTRAVENOUS at 13:40

## 2023-09-14 RX ADMIN — HYDROMORPHONE HYDROCHLORIDE 0.5 MG: 1 INJECTION, SOLUTION INTRAMUSCULAR; INTRAVENOUS; SUBCUTANEOUS at 17:08

## 2023-09-14 RX ADMIN — Medication 100 MCG: at 14:15

## 2023-09-14 RX ADMIN — FENTANYL CITRATE 100 MCG: 50 INJECTION, SOLUTION INTRAMUSCULAR; INTRAVENOUS at 13:40

## 2023-09-14 RX ADMIN — REMIFENTANIL HYDROCHLORIDE 0.2 MCG/KG/MIN: 1 INJECTION, POWDER, LYOPHILIZED, FOR SOLUTION INTRAVENOUS at 13:54

## 2023-09-14 RX ADMIN — PROPOFOL 50 MCG/KG/MIN: 10 INJECTION, EMULSION INTRAVENOUS at 13:50

## 2023-09-14 RX ADMIN — DEXAMETHASONE SODIUM PHOSPHATE 4 MG: 4 INJECTION INTRA-ARTICULAR; INTRALESIONAL; INTRAMUSCULAR; INTRAVENOUS; SOFT TISSUE at 17:47

## 2023-09-14 RX ADMIN — MIDAZOLAM 1 MG: 1 INJECTION INTRAMUSCULAR; INTRAVENOUS at 13:38

## 2023-09-14 RX ADMIN — KETAMINE HYDROCHLORIDE 25 MG: 10 INJECTION INTRAMUSCULAR; INTRAVENOUS at 13:52

## 2023-09-14 RX ADMIN — ONDANSETRON 4 MG: 2 INJECTION INTRAMUSCULAR; INTRAVENOUS at 16:10

## 2023-09-14 RX ADMIN — Medication 100 MCG: at 13:48

## 2023-09-14 RX ADMIN — PHENYLEPHRINE HYDROCHLORIDE 50 MCG/MIN: 10 INJECTION INTRAVENOUS at 13:54

## 2023-09-14 RX ADMIN — KETAMINE HYDROCHLORIDE 30 MG: 10 INJECTION INTRAMUSCULAR; INTRAVENOUS at 14:45

## 2023-09-14 RX ADMIN — SODIUM CHLORIDE: 9 INJECTION, SOLUTION INTRAVENOUS at 13:05

## 2023-09-14 RX ADMIN — INSULIN GLARGINE 10 UNITS: 100 INJECTION, SOLUTION SUBCUTANEOUS at 21:07

## 2023-09-14 RX ADMIN — ROCURONIUM BROMIDE 25 MG: 10 INJECTION INTRAVENOUS at 13:40

## 2023-09-14 RX ADMIN — CEFAZOLIN 2000 MG: 2 INJECTION, POWDER, FOR SOLUTION INTRAMUSCULAR; INTRAVENOUS at 13:53

## 2023-09-14 RX ADMIN — Medication 100 MCG: at 13:43

## 2023-09-14 RX ADMIN — PROPOFOL 80 MG: 10 INJECTION, EMULSION INTRAVENOUS at 13:40

## 2023-09-14 RX ADMIN — ATORVASTATIN CALCIUM 80 MG: 40 TABLET, FILM COATED ORAL at 21:06

## 2023-09-14 RX ADMIN — Medication 100 MCG: at 13:40

## 2023-09-14 RX ADMIN — CEFAZOLIN 2000 MG: 2 INJECTION, POWDER, FOR SOLUTION INTRAMUSCULAR; INTRAVENOUS at 21:29

## 2023-09-14 RX ADMIN — SODIUM CHLORIDE, PRESERVATIVE FREE 10 ML: 5 INJECTION INTRAVENOUS at 21:06

## 2023-09-14 RX ADMIN — OXYCODONE AND ACETAMINOPHEN 2 TABLET: 325; 5 TABLET ORAL at 23:49

## 2023-09-14 RX ADMIN — Medication 0.5 MG: at 17:08

## 2023-09-14 RX ADMIN — ACETAMINOPHEN 650 MG: 325 TABLET ORAL at 03:59

## 2023-09-14 ASSESSMENT — PAIN SCALES - GENERAL
PAINLEVEL_OUTOF10: 7
PAINLEVEL_OUTOF10: 8
PAINLEVEL_OUTOF10: 5
PAINLEVEL_OUTOF10: 7
PAINLEVEL_OUTOF10: 3
PAINLEVEL_OUTOF10: 4
PAINLEVEL_OUTOF10: 7

## 2023-09-14 ASSESSMENT — PAIN DESCRIPTION - LOCATION
LOCATION: NECK
LOCATION: NECK

## 2023-09-14 ASSESSMENT — PAIN DESCRIPTION - ORIENTATION: ORIENTATION: LEFT

## 2023-09-14 ASSESSMENT — LIFESTYLE VARIABLES: SMOKING_STATUS: 1

## 2023-09-14 ASSESSMENT — PAIN DESCRIPTION - DESCRIPTORS: DESCRIPTORS: SORE;BURNING

## 2023-09-14 ASSESSMENT — PAIN - FUNCTIONAL ASSESSMENT: PAIN_FUNCTIONAL_ASSESSMENT: 0-10

## 2023-09-14 NOTE — H&P
Surgical History and Physical Exam    Reason for surgery:  The patient is a 81 y.o. male presenting for C3-4 posterior cervical decompression, C3-4 and C4-5 posterior fusion. Past Medical History:    Past Medical History:   Diagnosis Date    CAD (coronary artery disease)     Falls     Myocardial infarction ([address])     PVD (peripheral vascular disease) ([address])     Urinary retention      Past Surgical History:    Past Surgical History:   Procedure Laterality Date    CARDIAC SURGERY      LEG AMPUTATION BELOW KNEE Right      Medications Prior to Admission:   Prior to Admission medications    Medication Sig Start Date End Date Taking? Authorizing Provider   insulin aspart (NOVOLOG FLEXPEN) 100 UNIT/ML injection pen Inject into the skin 3 times daily (before meals) Sliding scale   Yes Historical Provider, MD   gabapentin (NEURONTIN) 600 MG tablet Take 1 tablet by mouth 2 times daily. Yes Historical Provider, MD   HYDROcodone-acetaminophen (NORCO) 5-325 MG per tablet Take 1 tablet by mouth every 4 hours as needed for Pain.  Max Daily Amount: 6 tablets   Yes Historical Provider, MD   cyclobenzaprine (FLEXERIL) 5 MG tablet Take 1 tablet by mouth 2 times daily as needed for Muscle spasms   Yes Historical Provider, MD   tamsulosin (FLOMAX) 0.4 MG capsule Take 1 capsule by mouth daily   Yes Historical Provider, MD   alogliptin (NESINA) 12.5 MG TABS tablet Take 1 tablet by mouth daily   Yes Historical Provider, MD   folic acid (FOLVITE) 1 MG tablet Take 1 tablet by mouth daily   Yes Historical Provider, MD   aspirin 81 MG EC tablet Take 1 tablet by mouth daily   Yes Historical Provider, MD   nicotine (NICODERM CQ) Place 1 patch onto the skin daily 21mg/24hr   Yes Historical Provider, MD   carvedilol (COREG) 12.5 MG tablet Take 1 tablet by mouth 2 times daily (with meals)   Yes Historical Provider, MD   atorvastatin (LIPITOR) 80 MG tablet Take 1 tablet by mouth daily   Yes Historical Provider, MD   apixaban (ELIQUIS) 5 MG TABS
evidence of a proximal high-grade stenosis or occlusion. 5. Somewhat spiculated right lung nodule measuring 1.9 cm. Recommendations as below. RECOMMENDATIONS: 19 mm suspicious right part-solid pulmonary nodule. Per Fleischner Society Guidelines, recommend PET/CT, biopsy, or resection. These guidelines do not apply to immunocompromised patients and patients with cancer. Follow up in patients with significant comorbidities as clinically warranted. For lung cancer screening, adhere to Lung-RADS guidelines. Reference: Radiology. 2017; 284(1):228-43. Assessment :      Hospital Problems             Last Modified POA    * (Principal) Altered mental status 9/7/2023 Yes    Generalized weakness 9/7/2023 Yes    Below knee amputation ([address]) 9/7/2023 Yes    CAD (coronary artery disease), native coronary artery 9/7/2023 Yes    Overview Signed 9/7/2023  6:32 PM by Kiley Quijano MD     Formatting of this note might be different from the original.  1. One vessel coronary artery disease with acute thrombotic occlusion LAD, culprit for anterior MI   2. Moderate left ventricular dysfunction LVEF 30-35% with anterior wall hypokinesis apical focal akinesis   3. LVEDP 29   4. Successful PTCA, Aspiration thrombectomy, and placement of a Xience Xpedition 3X18 mm Drug Eluting Stent Mid LAD with excellent angiographic results  5.  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

## 2023-09-14 NOTE — BRIEF OP NOTE
Brief Postoperative Note      Patient: Laura Garcia  YOB: 1950  MRN: [id number]    Date of Procedure: 9/14/2023    Pre-Op Diagnosis Codes:     * Spinal stenosis in cervical region [M48.02]    Post-Op Diagnosis: Same       Procedure(s):  C3, 4, 5 POSTERIOR CERVICAL DECOMPRESSION    C3-4, C4-5 POSTERIOR FUSION WITH CORUS AND SSEP  Autogenous Graft     Surgeon(s):  Katelyn Lew MD    Assistant:  Resident: Lucía Mcgill DO    Anesthesia: General    Estimated Blood Loss (mL): 50    Fluids (mL): 1500    Urine Output (mL): 2919    Complications: None    Specimens:   * No specimens in log *    Implants:  Implant Name Type Inv.  Item Serial No.  Lot No. LRB No. Used Action   GRAFT BNE SUB 1CC DBM FRZ DRY PTTY GRFTON - [id number]  GRAFT BNE SUB 1CC DBM FRZ DRY PTTY GRFTON  [facility] TECH-WD [id number] N/A 1 Implanted   CAGE SPINAL CERVICAL CAVUX 4MM SPINAL-X TITANIUM - [id number]  CAGE SPINAL CERVICAL CAVUX 4MM SPINAL-X [facility] [employer] TECHNOLOGY-WD [id number] N/A 3 Implanted   CAGE CERV B 5MM TI ALLOY CAVUX - [id number]  CAGE CERV B 5MM TI ALLOY [facility] [employer] TECHN[employer]-WD [id number] N/A 1 Implanted   SCREW SPNL CERV [employer] [place] DTRAX - [id number]  SCREW SPNL CERV [employer] [facility] [employer] TE[employer]-WD [id number] N/A 7 Implanted         Drains:   Urinary Catheter 09/14/23 [place] (Active)       External Urinary Catheter (Active)   Site Assessment Clean,dry & intact 09/13/23 1706   Placement Repositioned 09/13/23 1480   Securement Method Securing device (Describe) 09/12/23 2235   Perineal Care Yes 09/12/23 2235   Suction Other 09/13/23 1706   Urine Color Yellow 09/13/23 1706   Urine Appearance Clear 09/13/23 1706   Output (mL) 300 mL 09/13/23 1706       Findings: See Op Note      Electronically signed by Lucía Mcgill DO on 9/14/2023 at 4:40 PM

## 2023-09-14 NOTE — PLAN OF CARE
Nutrition Problem #1: Increased nutrient needs  Intervention: Food and/or Nutrient Delivery: Continue Current Diet, Start Oral Nutrition Supplement  Nutritional Goal: At least 50% PO intake prior to discharge

## 2023-09-14 NOTE — PROGRESS NOTES
capsule Take 1 capsule by mouth daily   Yes Historical Provider, MD   melatonin 5 MG TABS tablet Take 1 tablet by mouth daily   Yes Historical Provider, MD     Current Facility-Administered Medications   Medication Dose Route Frequency Provider Last Rate Last Admin    lidocaine 1 % injection 1 mL  1 mL IntraDERmal Once PRN Hi Meyer MD        lactated ringers IV soln infusion   IntraVENous Continuous Hi Meyer MD        sodium chloride flush 0.9 % injection 5-40 mL  5-40 mL IntraVENous 2 times per day Hi Meyer MD        sodium chloride flush 0.9 % injection 5-40 mL  5-40 mL IntraVENous PRN Hi Meyer MD        0.9 % sodium chloride infusion   IntraVENous PRN Hi Meyer  mL/hr at 09/14/23 1305 New Bag at 09/14/23 1305    ceFAZolin (ANCEF) 2 g injection             remifentanil (ULTIVA) 2,000 mcg in sodium chloride 0.9 % 40 mL infusion  0.2 mcg/kg/min IntraVENous Continuous Dash Case MD 15.792 mL/hr at 09/14/23 1354 0.2 mcg/kg/min at 09/14/23 1354    gelatin adsorbable (GELFOAM) 12-7 MM sponge             lidocaine (XYLOCAINE) 2 % uro-jet             bupivacaine-EPINEPHrine (MARCAINE-w/EPINEPHRINE) 0.25% -1:220834 injection    PRN Dash Case MD   10 mL at 09/14/23 1420    cyclobenzaprine (FLEXERIL) tablet 10 mg  10 mg Oral TID PRN Lelo Smith MD   10 mg at 09/13/23 2228    insulin glargine (LANTUS) injection vial 10 Units  10 Units SubCUTAneous Nightly Lelo Smith MD   10 Units at 09/13/23 2228    oxyCODONE-acetaminophen (PERCOCET) 5-325 MG per tablet 2 tablet  2 tablet Oral Q4H PRN Lelo Smith MD   2 tablet at 09/13/23 1450    sodium chloride 0.9 % bolus 80 mL  80 mL IntraVENous Once Lelo Smith MD        alogliptin (NESINA) tablet 12.5 mg  12.5 mg Oral Daily Lelo Smith MD   12.5 mg at 09/13/23 1049    [Held by provider] apixaban (ELIQUIS) tablet 5 mg  5 mg Oral BID Lelo Smith MD   5 mg at 09/10/23 0906    [Held by provider] aspirin EC exam.    2.  The urinary bladder is very distended and there is prominence of the  bilateral renal collecting systems, potentially due to a bladder outlet  obstruction. IMPRESSION:    Primary Problem  Altered mental status    Active Hospital Problems    Diagnosis Date Noted    Cervical stenosis of spinal canal [M48.02] 09/12/2023    Elevated troponin [R77.8] 09/11/2023    Pain [R52] 09/11/2023    Goals of care, counseling/discussion [Z71.89] 09/11/2023    Encounter for palliative care [Z51.5] 09/11/2023    ACP (advance care planning) [Z71.89] 09/11/2023    Cervical myelopathy ([address]) [G95.9] 09/08/2023    Cerebral multi-infarct state [I69.30] 09/08/2023    Altered mental status [R41.82] 09/07/2023    Generalized weakness [R53.1] 09/07/2023    Below knee amputation ([address]) [S88.119A] 08/04/2023    Cigarette smoker [F17.210] 08/04/2023    Lung mass [R91.8] 08/04/2023    Peripheral arterial disease ([address]) [I73.9] 08/04/2023    CAD (coronary artery disease), native coronary artery [I25.10] 04/27/2013    Diabetes mellitus type 2 in obese ([address]) [E11.69, E66.9] 05/06/2009       RECOMMENDATIONS:  Records and labs and images were reviewed and discussed with the patient. Plan surgical intervention today  Hopefully a biopsy will be done to establish diagnosis  Discussed with patient's goals of care and treatment options. He will finalize his decision after review of pathology if possible. Explained the results of the imaging with the MRI and the bone scan. Findings are consistent with extensive bone metastasis likely from primary lung cancer. Patient had previous imaging showing lung mass and he had plan for biopsy at the [facility].    Further systemic treatment will be based on final pathology results. Pain control and supportive care   Patient's questions were answered to the best of his satisfaction and he verbalized full understanding and agreement.   Thank you for allowing us to participate in the care of

## 2023-09-14 NOTE — DISCHARGE INSTRUCTIONS
Orthopedic Spine Discharge Instructions:  -Mobilize as tolerated. Avoid excessive Bending, Lifting, and Twisting (BLT). -Maintain surgical dressing in place until follow-up if possible. If dressing comes off with activity and hygiene, may leave uncovered if no drainage. Otherwise may replace with simple gauze and tape dressing.  -Ice (20 minutes on and off 1 hour) as needed for swelling/pain.  -Drink plenty of fluids.  -Call the office or come to Emergency Room if signs of infection appear (hot, swollen, red, draining pus, fever). -Take medications as prescribed.  -Wean off narcotics (Percocet/Norco) as soon as possible. Do not take Tylenol if still taking narcotics. -No alcoholic beverages or driving/operating machinery while on narcotics  -Follow up with Dr. Merlyn Roberts in his office in 10-14 days after surgery/discharge. Call [phone] to schedule.

## 2023-09-14 NOTE — PROGRESS NOTES
[facility]  Office: [phone]  Madison Dorsey DO, Milan Ba, DO, Clare Eduardo Blood, DO, Gray Degroot MD, Severo Lopes, MD, Leida Magaña MD, Brenda Carlson MD,  Christy Meneses MD, Sabas Owen MD, Aletha Bray DO, Clara Valencia MD,  Josiah Carolina MD, Mirta Rogers MD, Delilah Dowell DO, Jimbo Moura MD,  Sherley Hart MD, Chantal Smyth MD, Odette Live MD, Karin Mcrae MD,  Olu Tanner MD, Mikhail Holley MD, Eduard Faulkner MD, Venkatesh Ortega MD, Barry Raphael DO, Manisha Gonzalez MD,  Blue Watson MD, Celia Mortimer, MD, Geronimo Smith, CNP,  Maximino Dong, CNP,, Mino Herbert, CNP,  Leonardo Goff, [facility], Nicolas Joy CNP, Tamy Henriquez, CNP, Heri Arambula, CNP, Marisa Stevens, CNP, Teddy Villafana, CNP, Lucila Oliveros, CNP, Michele Landaverde, CNS, Rene Tyson, [facility], Amanda Hawthorne[address]    Progress Note    9/14/2023    9:27 AM    Name:   Sara Mcgovern  MRN:     [id number]     Acct:      [de-identified]   Room:   [address] [place] [address] Day:  7  Admit Date:  9/7/2023 10:46 AM    PCP:   PROVIDER UNKNOWN  Code Status:  Full Code    Subjective:     Patient seen in follow-up for weakness, bilateral upper extremity and lower extremity weakness. Patient states \"I am doing okay\"    Entries noted, stress test was canceled and at this point in time decision has been made to proceed with surgery. He is considered a moderate to high risk due to his known ischemic cardiomyopathy. That said given his severe stenosis and what is presumed to be metastatic lung cancer it is reasonable to proceed at least from a palliative perspective. He will require tissue biopsy and outpatient follow-up with oncology to discuss treatment for what is presumed to be is metastatic cancer. The patient feels well otherwise and denies any questions or concerns.   Surgery scheduled at ventricular dysfunction LVEF 30-35% with anterior wall hypokinesis apical focal akinesis   3. LVEDP 29   4. Successful PTCA, Aspiration thrombectomy, and placement of a Xience Xpedition 3X18 mm Drug Eluting Stent Mid LAD with excellent angiographic results  5.  EF: 30-35% with anterior wall and apical hypokinesis         Cigarette smoker 9/7/2023 Yes    Diabetes mellitus type 2 in obese ([address]) 9/7/2023 Yes    Lung mass 9/7/2023 Yes    Peripheral arterial disease ([address]) 9/7/2023 Yes    Cervical myelopathy ([address]) 9/8/2023 Yes    Cerebral multi-infarct state 9/8/2023 Yes    Elevated troponin 9/11/2023 Yes    Pain 9/11/2023 Yes    Goals of care, counseling/discussion 9/11/2023 Yes    Encounter for palliative care 9/11/2023 Yes    ACP (advance care planning) 9/11/2023 Yes    Cervical stenosis of spinal canal 9/12/2023 Yes       Plan:     Upper and lower extremity weakness secondary to severe cervical stenosis  Cervical decompression today  Presumed metastatic lung cancer  Possible tissue biopsy in the operating room today if not patient will require biopsy and outpatient follow-up with oncology  Ischemic cardiomyopathy secondary to known coronary disease  Moderate to high risk for the proposed surgery but not prohibitive  Avoid excessive fluids given ischemic cardiomyopathy  Continue medical management  Diabetes mellitus  Continue insulin sliding scale    Medical Decision Making: Tab Holguin DO  9/14/2023  9:27 AM

## 2023-09-14 NOTE — PROGRESS NOTES
Occupational [address]  Occupational Therapy Not Seen Note    DATE: 2023    NAME: Tila Ngo  MRN: [id number]   : 1950      Patient not seen this date for Occupational Therapy due to:    Surgery/Procedure: Pt scheduled for C3-5 lami early this afternoon per RN.  Will continue to pursue as able    Next Scheduled Treatment: 9/15    Electronically signed by Bladimir Zurita on 2023 at 11:12 AM

## 2023-09-14 NOTE — PROGRESS NOTES
Physical Therapy        Physical Therapy Cancel Note      DATE: 2023    NAME: Sid Garcia  MRN: [id number]   : 1950      Patient not seen this date for Physical Therapy due to:    Surgery/Procedure: Lami/fusion at 1330 today. Will recheck tomorrow.       Electronically signed by Elvira Donaldson PT on 2023 at 11:21 AM

## 2023-09-14 NOTE — ANESTHESIA POSTPROCEDURE EVALUATION
Department of Anesthesiology  Postprocedure Note    Patient: Cecelia Rice  MRN: [id number]  YOB: 1950  Date of evaluation: 9/14/2023      Procedure Summary     Date: 09/14/23 Room / Location: [address]) [facility]    Anesthesia Start: 4060 Anesthesia Stop: 1867    Procedure: C3-4 POSTERIOR CERVICAL DECOMPRESSION  C3-4, C4-5 POSTERIOR FUSION WITH CORUS AND SSEP (Spine Cervical) Diagnosis:       Spinal stenosis in cervical region      (Spinal stenosis in cervical region [M48.02])    Surgeons: Daphne Monae MD Responsible Provider: Marie Issa MD    Anesthesia Type: general ASA Status: 4          Anesthesia Type: No value filed.     Lydia Phase I: Lydia Score: 9    Lydia Phase II:        Anesthesia Post Evaluation    Patient location during evaluation: PACU  Patient participation: complete - patient participated  Level of consciousness: awake and alert  Airway patency: patent  Nausea & Vomiting: no nausea and no vomiting  Complications: no  Cardiovascular status: blood pressure returned to baseline  Respiratory status: acceptable and room air  Hydration status: euvolemic  Pain management: adequate and satisfactory to patient

## 2023-09-14 NOTE — PROGRESS NOTES
Comprehensive Nutrition Assessment    Type and Reason for Visit:  RD Nutrition Re-Screen/LOS    Nutrition Recommendations/Plan:   Advance diet when clinically appropriate  ONS TID once diet advanced  Monitor weight, intake, labs, skin     Malnutrition Assessment:  Malnutrition Status:  No malnutrition (09/14/23 1415)    Context:  Acute Illness     Findings of the 6 clinical characteristics of malnutrition:  Energy Intake:  No significant decrease in energy intake  Weight Loss:  No significant weight loss     Body Fat Loss:  Mild body fat loss     Muscle Mass Loss:  Mild muscle mass loss    Fluid Accumulation:  No significant fluid accumulation     Strength:  Not Performed    Nutrition Assessment:    Pt admitted with altered mental status that has resolved. LOS nutrition assessment. Pt currently in OR for C3-4 and C4-5 fusion. No weight loss per EMR (initial stated weight inaccurate). Pt has hx of R BKA. Pressure wound noted. Pt would benefit from ONS as diet advances. PO intake 51-75%. Nutrition Related Findings:    No edema noted. Glu 133. All other labs/meds reviewed. Wound Type: Surgical Incision, Pressure Injury       Current Nutrition Intake & Therapies:    Average Meal Intake: 51-75%  Average Supplements Intake: None Ordered  Diet NPO    Anthropometric Measures:  Height: 5' 7\" (170.2 cm)  Ideal Body Weight (IBW): 148 lbs (67 kg)    Current Body Weight: 145 lb 1 oz (65.8 kg), 98 % IBW.     Current BMI (kg/m2): 22.7   BMI Categories: Normal Weight (BMI 22.0 to 24.9) age over 72    Estimated Daily Nutrient Needs:  Energy Requirements Based On: Kcal/kg  Weight Used for Energy Requirements: Current  Energy (kcal/day): 0374-3922 kcal/day (26-29 kcal/kg)  Weight Used for Protein Requirements: Current  Protein (g/day):  g/day (1.5-2 g/kg IBW)  Method Used for Fluid Requirements: 1 ml/kcal  Fluid (ml/day): 9700-5587 ml    Nutrition Diagnosis:   Increased nutrient needs related to increase demand for energy/nutrients as evidenced by wounds    Nutrition Interventions:   Food and/or Nutrient Delivery: Continue Current Diet, Start Oral Nutrition Supplement  Nutrition Education/Counseling: No recommendation at this time  Coordination of Nutrition Care: Continue to monitor while inpatient, Interdisciplinary Rounds    Goals:  Goals: PO intake 50% or greater, prior to discharge      Nutrition Monitoring and Evaluation:   Behavioral-Environmental Outcomes: None Identified  Food/Nutrient Intake Outcomes: Diet Advancement/Tolerance, Supplement Intake, Food and Nutrient Intake  Physical Signs/Symptoms Outcomes: Biochemical Data, Nutrition Focused Physical Findings, Skin, Weight, Fluid Status or Edema    Discharge Planning:     Too soon to determine     KAITLIN Martel, RDN, LD  Registered [address] [place] [address]  [phone]

## 2023-09-14 NOTE — PLAN OF CARE
Face-to-Face    Patient seen and examined. Discussed the need for medical equipment to assist with their recovery during the post-operative period.   Based on the patient's current physical condition and post-operative restrictions, we have determined that they will need: soft cervical collar.     _________________________________  Lucía Mcgill DO  Orthopedic Surgery Resident, PGY-2  [address], [place]  4:44 PM 9/14/2023

## 2023-09-14 NOTE — ANESTHESIA PRE PROCEDURE
Department of Anesthesiology  Preprocedure Note       Name:  Wendy Olmedo   Age:  81 y.o.  :  1950                                          MRN:  [id number]         Date:  2023      Surgeon: Anival Ricardo):  Sobia Wetzel MD    Procedure: Procedure(s):  C3-4 POSTERIOR CERVICAL DECOMPRESSION  C3-4, C4-5 POSTERIOR FUSION WITH CORUS AND SSEP    Medications prior to admission:   Prior to Admission medications    Medication Sig Start Date End Date Taking? Authorizing Provider   insulin aspart (NOVOLOG FLEXPEN) 100 UNIT/ML injection pen Inject into the skin 3 times daily (before meals) Sliding scale   Yes Historical Provider, MD   gabapentin (NEURONTIN) 600 MG tablet Take 1 tablet by mouth 2 times daily. Yes Historical Provider, MD   HYDROcodone-acetaminophen (NORCO) 5-325 MG per tablet Take 1 tablet by mouth every 4 hours as needed for Pain.  Max Daily Amount: 6 tablets   Yes Historical Provider, MD   cyclobenzaprine (FLEXERIL) 5 MG tablet Take 1 tablet by mouth 2 times daily as needed for Muscle spasms   Yes Historical Provider, MD   tamsulosin (FLOMAX) 0.4 MG capsule Take 1 capsule by mouth daily   Yes Historical Provider, MD   alogliptin (NESINA) 12.5 MG TABS tablet Take 1 tablet by mouth daily   Yes Historical Provider, MD   folic acid (FOLVITE) 1 MG tablet Take 1 tablet by mouth daily   Yes Historical Provider, MD   aspirin 81 MG EC tablet Take 1 tablet by mouth daily   Yes Historical Provider, MD   nicotine (NICODERM CQ) Place 1 patch onto the skin daily 21mg/24hr   Yes Historical Provider, MD   carvedilol (COREG) 12.5 MG tablet Take 1 tablet by mouth 2 times daily (with meals)   Yes Historical Provider, MD   atorvastatin (LIPITOR) 80 MG tablet Take 1 tablet by mouth daily   Yes Historical Provider, MD   apixaban (ELIQUIS) 5 MG TABS tablet Take by mouth 2 times daily   Yes Historical Provider, MD   omeprazole (PRILOSEC) 20 MG delayed release capsule Take 1 capsule by mouth daily   Yes Historical

## 2023-09-14 NOTE — PROGRESS NOTES
Patient is preparing for surgery today and R.N is with him. Per the progress notes the patient is having a C3-4 posterior cervical decompression and C3-4and C4-5 posterior fusion. There will be a possible lung biopsy with cervical surgery today, otherwise will need a CT guided biopsy of lung mass. Palliative to follow up after surgery and as well after lung biopsy for goals of care and support. Jimmie Closs [address] Lewis RN,CHPN   [facility]: [phone]  [facility]: [phone]  [facility]: [phone]

## 2023-09-15 PROBLEM — S14.123A CENTRAL CORD SYNDROME AT C3 LEVEL OF CERVICAL SPINAL CORD (HCC): Status: ACTIVE | Noted: 2023-09-15

## 2023-09-15 LAB
GLUCOSE BLD-MCNC: 217 MG/DL (ref 75–110)
GLUCOSE BLD-MCNC: 267 MG/DL (ref 75–110)
GLUCOSE BLD-MCNC: 279 MG/DL (ref 75–110)
GLUCOSE BLD-MCNC: 313 MG/DL (ref 75–110)

## 2023-09-15 PROCEDURE — 97110 THERAPEUTIC EXERCISES: CPT

## 2023-09-15 PROCEDURE — 82947 ASSAY GLUCOSE BLOOD QUANT: CPT

## 2023-09-15 PROCEDURE — 1200000000 HC SEMI PRIVATE

## 2023-09-15 PROCEDURE — 6370000000 HC RX 637 (ALT 250 FOR IP): Performed by: CHIROPRACTOR

## 2023-09-15 PROCEDURE — 99232 SBSQ HOSP IP/OBS MODERATE 35: CPT | Performed by: HOSPITALIST

## 2023-09-15 PROCEDURE — 6370000000 HC RX 637 (ALT 250 FOR IP): Performed by: SURGERY

## 2023-09-15 PROCEDURE — 97112 NEUROMUSCULAR REEDUCATION: CPT

## 2023-09-15 PROCEDURE — 6360000002 HC RX W HCPCS: Performed by: CHIROPRACTOR

## 2023-09-15 PROCEDURE — 97530 THERAPEUTIC ACTIVITIES: CPT

## 2023-09-15 PROCEDURE — 2700000000 HC OXYGEN THERAPY PER DAY

## 2023-09-15 PROCEDURE — 99233 SBSQ HOSP IP/OBS HIGH 50: CPT | Performed by: SURGERY

## 2023-09-15 PROCEDURE — 2580000003 HC RX 258: Performed by: CHIROPRACTOR

## 2023-09-15 PROCEDURE — 99231 SBSQ HOSP IP/OBS SF/LOW 25: CPT | Performed by: INTERNAL MEDICINE

## 2023-09-15 PROCEDURE — 94760 N-INVAS EAR/PLS OXIMETRY 1: CPT

## 2023-09-15 PROCEDURE — 6370000000 HC RX 637 (ALT 250 FOR IP): Performed by: HOSPITALIST

## 2023-09-15 RX ORDER — TRAMADOL HYDROCHLORIDE 50 MG/1
100 TABLET ORAL EVERY 6 HOURS PRN
Status: DISCONTINUED | OUTPATIENT
Start: 2023-09-15 | End: 2023-09-17

## 2023-09-15 RX ORDER — INSULIN LISPRO 100 [IU]/ML
0-4 INJECTION, SOLUTION INTRAVENOUS; SUBCUTANEOUS NIGHTLY
Status: DISCONTINUED | OUTPATIENT
Start: 2023-09-15 | End: 2023-09-22 | Stop reason: HOSPADM

## 2023-09-15 RX ORDER — TRAMADOL HYDROCHLORIDE 50 MG/1
50 TABLET ORAL EVERY 6 HOURS PRN
Status: DISCONTINUED | OUTPATIENT
Start: 2023-09-15 | End: 2023-09-17

## 2023-09-15 RX ORDER — INSULIN LISPRO 100 [IU]/ML
0-16 INJECTION, SOLUTION INTRAVENOUS; SUBCUTANEOUS
Status: DISCONTINUED | OUTPATIENT
Start: 2023-09-15 | End: 2023-09-22 | Stop reason: HOSPADM

## 2023-09-15 RX ADMIN — INSULIN LISPRO 2 UNITS: 100 INJECTION, SOLUTION INTRAVENOUS; SUBCUTANEOUS at 08:39

## 2023-09-15 RX ADMIN — DEXAMETHASONE SODIUM PHOSPHATE 4 MG: 4 INJECTION INTRA-ARTICULAR; INTRALESIONAL; INTRAMUSCULAR; INTRAVENOUS; SOFT TISSUE at 01:33

## 2023-09-15 RX ADMIN — SODIUM CHLORIDE, PRESERVATIVE FREE 10 ML: 5 INJECTION INTRAVENOUS at 08:37

## 2023-09-15 RX ADMIN — METOPROLOL TARTRATE 12.5 MG: 25 TABLET, FILM COATED ORAL at 11:58

## 2023-09-15 RX ADMIN — GABAPENTIN 600 MG: 300 CAPSULE ORAL at 22:18

## 2023-09-15 RX ADMIN — SODIUM CHLORIDE, PRESERVATIVE FREE 10 ML: 5 INJECTION INTRAVENOUS at 22:18

## 2023-09-15 RX ADMIN — INSULIN GLARGINE 10 UNITS: 100 INJECTION, SOLUTION SUBCUTANEOUS at 22:30

## 2023-09-15 RX ADMIN — CYCLOBENZAPRINE 10 MG: 10 TABLET, FILM COATED ORAL at 17:36

## 2023-09-15 RX ADMIN — METOPROLOL TARTRATE 12.5 MG: 25 TABLET, FILM COATED ORAL at 22:17

## 2023-09-15 RX ADMIN — DEXAMETHASONE SODIUM PHOSPHATE 4 MG: 4 INJECTION INTRA-ARTICULAR; INTRALESIONAL; INTRAMUSCULAR; INTRAVENOUS; SOFT TISSUE at 08:37

## 2023-09-15 RX ADMIN — CEFAZOLIN 2000 MG: 2 INJECTION, POWDER, FOR SOLUTION INTRAMUSCULAR; INTRAVENOUS at 05:23

## 2023-09-15 RX ADMIN — PANTOPRAZOLE SODIUM 40 MG: 40 TABLET, DELAYED RELEASE ORAL at 05:23

## 2023-09-15 RX ADMIN — CYCLOBENZAPRINE 10 MG: 10 TABLET, FILM COATED ORAL at 22:33

## 2023-09-15 RX ADMIN — INSULIN LISPRO 12 UNITS: 100 INJECTION, SOLUTION INTRAVENOUS; SUBCUTANEOUS at 17:36

## 2023-09-15 RX ADMIN — INSULIN LISPRO 8 UNITS: 100 INJECTION, SOLUTION INTRAVENOUS; SUBCUTANEOUS at 11:59

## 2023-09-15 RX ADMIN — TAMSULOSIN HYDROCHLORIDE 0.4 MG: 0.4 CAPSULE ORAL at 08:36

## 2023-09-15 RX ADMIN — ATORVASTATIN CALCIUM 80 MG: 40 TABLET, FILM COATED ORAL at 22:17

## 2023-09-15 RX ADMIN — DEXAMETHASONE SODIUM PHOSPHATE 4 MG: 4 INJECTION INTRA-ARTICULAR; INTRALESIONAL; INTRAMUSCULAR; INTRAVENOUS; SOFT TISSUE at 17:36

## 2023-09-15 RX ADMIN — GABAPENTIN 600 MG: 300 CAPSULE ORAL at 08:37

## 2023-09-15 RX ADMIN — ALOGLIPTIN 12.5 MG: 12.5 TABLET, FILM COATED ORAL at 08:37

## 2023-09-15 RX ADMIN — OXYCODONE AND ACETAMINOPHEN 2 TABLET: 325; 5 TABLET ORAL at 08:54

## 2023-09-15 ASSESSMENT — PAIN DESCRIPTION - LOCATION: LOCATION: LEG

## 2023-09-15 ASSESSMENT — PAIN DESCRIPTION - DESCRIPTORS: DESCRIPTORS: SPASM

## 2023-09-15 ASSESSMENT — PAIN SCALES - GENERAL: PAINLEVEL_OUTOF10: 4

## 2023-09-15 ASSESSMENT — PAIN DESCRIPTION - ORIENTATION: ORIENTATION: RIGHT

## 2023-09-15 NOTE — OP NOTE
[facility] [place], [facility]0 [facility]                                OPERATIVE REPORT    PATIENT NAME: Barney Siddiqui                   :        1950  MED REC NO:   [id number]                             ROOM:       [facility]  ACCOUNT NO:   [de-identified]                           ADMIT DATE: 2023  PROVIDER:     Sarah Moon MD    DATE OF PROCEDURE:  2023    PREOPERATIVE DIAGNOSES:  1. Severe spinal stenosis with myelopathy C3-C4, C4-C5. 2.  Herniated disc C3-C4. 3.  Central cord syndrome C3-C4. POSTOPERATIVE DIAGNOSES:  1. Severe spinal stenosis with myelopathy C3-C4, C4-C5. 2.  Herniated disc C3-C4. 3.  Central cord syndrome C3-C4. PROCEDURE:  1. Posterior cervical laminectomy C3, C4, C5.  2.  Posterior cervical fusion C3-C4 and C4-C5 with CORUS  instrumentation. 3.  Autogenous in situ bone graft using laminectomy bone, augmented with  graft on DBM. 4.  Neuromonitoring through Ev[employer]. SURGEON:  Sarah Moon MD    ASSISTANT:  Nupur Mcclain DO    INDICATIONS:  The patient is a 44-year-old gentleman with significant  weakness of upper extremities, central cord syndrome pattern with cord  changes along the C3-4, C4-5 segment. He had central disc herniation,  severe stenosis related to that, and progressive weakness over the last  6 months. Due to significant pain and symptoms, counseled on surgical  treatment. The surgical procedure, risks, benefits and complications  were discussed with good comprehension and informed consent obtained. NARRATIVE PROCEDURE:  The patient was brought to the operating room,  placed under appropriate general anesthesia, and transferred to the  operating table in the prone position on a Jose frame. Bony  prominences, axillae and eyes were protected.   Perioperative antibiotics  given prior to incision time, and VTE prophylaxis done intraoperatively  through SCD

## 2023-09-15 NOTE — PROGRESS NOTES
Occupational Therapy  Facility/Department: [facility] PROGRESSIVE CARE  Occupational Therapy Daily Treatment Note     Name: Barney Siddiqui  : 1950  MRN: [id number]  Date of Service: 9/15/2023    Discharge Recommendations:  Patient would benefit from continued therapy after discharge  OT Equipment Recommendations  Other: continue to assess       Patient Diagnosis(es): The primary encounter diagnosis was Elevated troponin. Diagnoses of Generalized weakness and Numbness were also pertinent to this visit. Past Medical History:  has a past medical history of CAD (coronary artery disease), Falls, Myocardial infarction ([address]), PVD (peripheral vascular disease) ([address]), and Urinary retention. Past Surgical History:  has a past surgical history that includes Leg amputation below knee (Right); Cardiac surgery; Cervical spine surgery (N/A, 2023); and cervical fusion (N/A, 2023). Assessment   Performance deficits / Impairments: Decreased functional mobility ; Decreased safe awareness;Decreased balance;Decreased coordination;Decreased ROM; Decreased endurance;Decreased ADL status; Decreased strength  Assessment: Pt progressing slowly towards STGs. Pt seen by OT/PT to support improved participation in [place] [employer] [facility] uberlife activities. Pt ddep for supine>sitting EOB; Pt sat EOB for ~18 mins with OT/PT. PT working on seated balance and core strength while OT working on BUE strengthening (grasp and B elbow strength) and functional reach. OT/PT then switching and OT working on seated balance and posture while PT working on BLE stretching/strengthening; pt presents with winging in B scapulas more prominent in R during BUE activities; Dep x2 for sit>supine. Pt declined need for ADLs this AM. Pt dep x1 for rolling in bed to reposition linen. Pt would benefit from continued therapy services during hospitalization and following discharge to address functional deficits listed above.   Prognosis: Fair  Activity

## 2023-09-15 NOTE — CARE COORDINATION
Writer spoke with patient at bedside     [facility] [place] [facility]   As 9/14 spoke with Arcadio Carlson [phone]. Patient notified will have to confirm if bed is still available. .    [address] [place] [address] does not pay any transport costs patient notified. Current plan Patient would like to return to his home and not return to [facility] and arrange with his family to take him  to [address] [place] [address] in [place] for post acute rehab. He has been working with social work Sandrine @ [phone] regarding the transfer to [address] [place] [address] in [place].

## 2023-09-15 NOTE — PROGRESS NOTES
[facility]  Office: [address], DO, Gricelda Chaffee, DO, Grenville Me, DO, Oralee Fondmaia Blood, DO, Caity Lagos MD, Osiris Mcfadden MD, Melba Lezama MD, Keri Miller MD,  Catherine Ramsay MD, Francisca Fuller MD, Hanna Madison, DO, Jovanna Flores MD,  Josué Hollis MD, Julia Proctor MD, Ana Rosa Berkowitz DO, Lenore Kaur MD,  Earnest Sol DO, Leonardo Aguayo MD, Krupa Tam MD, Claudia Sahu MD, Eusebio Steve MD,  Eliane Finley MD, Kina Guillory MD, Farzana Valerio MD, Romi Contreras MD, Buster Santoro DO, Camila Lester MD,  Alina Mcwilliams MD, Hung Burr MD, Devora Shannon, CNP,  Sara Lockwood, CNP,, José Miguel Patel, CNP,  Angelo Zamudio, [facility], Param Longoria, CNP, Leila Roman, CNP, Vangie Fitzgerald, CNP, Joon Izquierdo, CNP, Neri Altamirano, CNP, Shaye Giles, CNP, Jeremiah Morin, CNS, Lashon Royal, CNP, Shane Garcia[address]    Progress Note    9/15/2023    11:26 AM    Name:   Wendy Olmedo  MRN:     [id number]     Acct:      [de-identified]   Room:   [address] [place] [address] Day:  8  Admit Date:  9/7/2023 10:46 AM    PCP:   PROVIDER UNKNOWN  Code Status:  Full Code    Subjective:     Patient seen in follow-up for weakness secondary to cervical stenosis, patient states \"I am feeling better\"    Patient is doing much better overall. He states that his upper extremity weakness is improved dramatically with cervical decompression. The patient tells me that his pain is under reasonable control at this point in time. He did have some urinary retention and urology has been consulted and Aviles catheter has been placed. Potential voiding trial prior to discharge to skilled nursing facility. The patient is in good spirits and denies any questions or concerns. Medications: Allergies:     Allergies   Allergen Reactions    Ciprofloxacin        Current Meds:   Scheduled Meds:    metoprolol Decadron  Increase insulin sliding scale to high correction algorithm    Medical Decision Making: Tab Balderas DO  9/15/2023  11:26 AM

## 2023-09-15 NOTE — PROGRESS NOTES
Balance training, Functional mobility training, Transfer training, Endurance training, Wheelchair mobility training, Neuromuscular re-education, Therapeutic activities, Home exercise program, Safety education & training  Safety Devices  Type of Devices: Bed alarm in place, Call light within reach, Patient at risk for falls, Left in bed, Nurse notified  Restraints  Restraints Initially in Place: No     Restrictions  Restrictions/Precautions  Restrictions/Precautions: Fall Risk, Bed Alarm  Required Braces or Orthoses?: No  Position Activity Restriction  Other position/activity restrictions: up with assistance; R BKA (knee flexion contracture); weak chiara hand , cervical lami and decompression 9/14, cervical collar     Subjective   General  Family / Caregiver Present: No  Follows Commands: Within Functional Limits  General Comment  Comments: On 9/14 pt underwent C3-5 laminectomy and PLIF per Dr. Cheryl Javier. \"soft cervical collar\" ordered. Pt in room with hard cervical collar. RN notified and stated she will Perfect Serve Cheryl Xiemino. Subjective  Subjective: Pt reporting cervical pain (mostly from brace) as 5/10. Also, c/o left clavicle pain                Cognition   Orientation  Overall Orientation Status: Within Functional Limits  Orientation Level: Oriented X4  Cognition  Overall Cognitive Status: Exceptions  Arousal/Alertness: Delayed responses to stimuli  Following Commands: Follows one step commands consistently; Follows multistep commands with repitition (pt easily distracted)  Attention Span: Attends with cues to redirect  Memory: Decreased short term memory  Safety Judgement: Decreased awareness of need for assistance;Decreased awareness of need for safety  Problem Solving: Assistance required to generate solutions;Assistance required to identify errors made;Assistance required to implement solutions;Assistance required to correct errors made  Insights: Decreased awareness of deficits  Initiation: Requires cues for some  Sequencing: Requires cues for some  Cognition Comment: occasional confusion to place. Pt particular with tasks,  increase time and preparation for functional activites     Objective                             Bed mobility  Rolling to Left: Dependent/Total;2 Person assistance  Supine to Sit: Dependent/Total;2 Person assistance  Sit to Supine: Dependent/Total;2 Person assistance  Bed Mobility Comments: Pt sat on side of bed for ~18 minutes with PT/OT. Pt requiring max assist for edge of bed balance, noting posterior lean. Pt utilizing chiara bed rails for support. Cervical collar on for all activities and mobility. Pt with decrease perception of trunk balance. PT provided balance education while OT working on UE tasks. OT providing balance education while PT working on LE stretches  Transfers  Comment: Pt not appropriate or safe for attempt at standing this date             Exercise Treatment: Seated LLE x 10 reps LAQ min assist, left hamstring and calf stretches x 3 reps hold 10\" each. Pt performed right knee ext/flexion at side of bed mod assist for hamstring stretch. Noted decrease in frequency of right knee and hip flexion spasms        OutComes Score                                                  AM-PAC Score  -PAC Inpatient Mobility Raw Score : 6 (09/15/23 1335)  -PAC Inpatient T-Scale Score : 23.55 (09/15/23 1335)  Mobility Inpatient CMS 0-100% Score: 100 (09/15/23 1335)  Mobility Inpatient CMS G-Code Modifier : CN (09/15/23 1335)               Goals  Short Term Goals  Time Frame for Short Term Goals: 14 days  Short Term Goal 1: Progress bed mobility to min A x 1. Short Term Goal 2: Progress standing with [employer] Stedy to mod A x 1. Short Term Goal 3: Pt to tolerate mild stretching to R residual limb and improve ROM extension by at least 15 deg. Short Term Goal 4: Pt to have at least 4-/5 to 4/5 BLE muscle strength. Short Term Goal 5: Pt to propel w/c x 50 ft, SBA.   Patient Goals   Patient Goals

## 2023-09-15 NOTE — PROGRESS NOTES
[facility]  PROGRESS NOTE    Room # 308/308-01   Name: Lluvia Mercado              Reason for visit: Routine    I visited the patient. Admit Date & Time: 9/7/2023 10:46 AM    Assessment:  Lluvia Mercado is a 81 y.o. male in the hospital because \"numbness\". Upon entering the room patient was lying in bed. Patient shared about his procedure yesterday and shared that he is feeling improvement since procedure. Patient appeared to be hopeful. Patient also shared about his family. Patient stated that his sister visits him in hospital.       Intervention:  I introduced myself and my title as  I offered space for patient  to express feelings, needs, and concerns and provided a ministry presence. This writer actively listened to patient. This writer offered words of encouragement. This writer also assured patient of his prayers. Outcome:  Patient expressed gratitude to this writer for visiting     Plan:  Chaplains will remain available to offer spiritual and emotional support as needed. Electronically signed by Shyla Phelan on 9/15/2023 at 2:41 PM.  [id number] [place] [address] Drive        09/15/23 1440   Encounter Summary   Encounter Overview/Reason  Follow-up   Service Provided For: Patient   Referral/Consult From: [address] Pearson Family members   Last Encounter  09/15/23   Complexity of Encounter Low   Begin Time 1425   End Time  1435   Total Time Calculated 10 min   Spiritual/Emotional needs   Type Spiritual Support   Assessment/Intervention/Outcome   Assessment Calm;Coping; Hopeful   Intervention Active listening;Sustaining Presence/Ministry of presence; Explored/Affirmed feelings, thoughts, concerns   Outcome Engaged in conversation;Coping;Expressed Gratitude   Plan and Referrals   Plan/Referrals Continue Support (comment)

## 2023-09-15 NOTE — PROGRESS NOTES
_                         Today's Date: 9/15/2023  Patient Name: Yuridia Carmona  Date of admission: 9/7/2023 10:46 AM  Patient's age: 81 y. o., 1950  Admission Dx: Altered mental status [R41.82]  Generalized weakness [R53.1]  Elevated troponin [R77.8]      Requesting Physician: Sil Ochoa MD    CHIEF COMPLAINT: Generalized weakness and fatigue. Failure to thrive. Progressive weakness of both arms. Lung mass. Pulm metastasis. INTERIM HISTORY  The patient is seen and evaluated   He is feeling much better. Surgery was done yesterday and it went well. No bleeding or bruising. HISTORY OF PRESENT ILLNESS:      The patient is a 81 y.o.  male who is admitted to the hospital for further management of altered mental status with worsening weakness and fatigue. He had multiple other findings including elevated troponin and abnormal imaging. The patient was recently diagnosed with lung mass based on screening CT scan due to history of smoking. He smoked average of 2 to 3 packs/day for more than 50 years. Recently down to about 12 to 15 cigarettes daily. CT scan showed suspicious mass in the lung and he was scheduled for biopsy at the [facility]. It was not done yet. Patient had increasing weakness of both arms and hands over the last several months. He was using wheelchair and recently had difficulty operating and using upper extremities. 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 The patient had evaluation with MRI of the cervical spine which showed severe canal stenosis. He was evaluated by orthopedics with plan for possible intervention and surgery. Patient denies any chest pain. No cough, sputum or hemoptysis. No headaches. No other complaints.     Past Medical History:   has a past medical history of CAD (coronary artery disease), Falls, Myocardial infarction ([address]), PVD recovery/rehab    Further systemic treatment will be based on final pathology results. Pain control and supportive care   Patient's questions were answered to the best of his satisfaction and he verbalized full understanding and agreement. Thank you for allowing us to participate in the care of this pleasant patient. Meka Raphael MD, MD                                               This note is created with the assistance of a speech recognition program.  While intending to generate a document that actually reflects the content of the visit, the document can still have some errors including those of syntax and sound a like substitutions which may escape proof reading. It such instances, actual meaning can be extrapolated by contextual diversion.

## 2023-09-16 LAB
GLUCOSE BLD-MCNC: 176 MG/DL (ref 75–110)
GLUCOSE BLD-MCNC: 192 MG/DL (ref 75–110)
GLUCOSE BLD-MCNC: 203 MG/DL (ref 75–110)
GLUCOSE BLD-MCNC: 228 MG/DL (ref 75–110)

## 2023-09-16 PROCEDURE — 1200000000 HC SEMI PRIVATE

## 2023-09-16 PROCEDURE — 82947 ASSAY GLUCOSE BLOOD QUANT: CPT

## 2023-09-16 PROCEDURE — 2580000003 HC RX 258: Performed by: CHIROPRACTOR

## 2023-09-16 PROCEDURE — 6370000000 HC RX 637 (ALT 250 FOR IP): Performed by: CHIROPRACTOR

## 2023-09-16 PROCEDURE — 99232 SBSQ HOSP IP/OBS MODERATE 35: CPT | Performed by: HOSPITALIST

## 2023-09-16 PROCEDURE — 6370000000 HC RX 637 (ALT 250 FOR IP): Performed by: HOSPITALIST

## 2023-09-16 PROCEDURE — 6370000000 HC RX 637 (ALT 250 FOR IP): Performed by: SURGERY

## 2023-09-16 RX ORDER — INSULIN GLARGINE 100 [IU]/ML
10 INJECTION, SOLUTION SUBCUTANEOUS 2 TIMES DAILY
Status: DISCONTINUED | OUTPATIENT
Start: 2023-09-16 | End: 2023-09-22 | Stop reason: HOSPADM

## 2023-09-16 RX ADMIN — METOPROLOL TARTRATE 12.5 MG: 25 TABLET, FILM COATED ORAL at 08:22

## 2023-09-16 RX ADMIN — SODIUM CHLORIDE, PRESERVATIVE FREE 10 ML: 5 INJECTION INTRAVENOUS at 08:23

## 2023-09-16 RX ADMIN — TRAMADOL HYDROCHLORIDE 50 MG: 50 TABLET, COATED ORAL at 03:01

## 2023-09-16 RX ADMIN — TAMSULOSIN HYDROCHLORIDE 0.4 MG: 0.4 CAPSULE ORAL at 08:22

## 2023-09-16 RX ADMIN — ACETAMINOPHEN 650 MG: 325 TABLET ORAL at 15:13

## 2023-09-16 RX ADMIN — GABAPENTIN 600 MG: 300 CAPSULE ORAL at 08:22

## 2023-09-16 RX ADMIN — TRAMADOL HYDROCHLORIDE 100 MG: 50 TABLET, COATED ORAL at 10:02

## 2023-09-16 RX ADMIN — ALOGLIPTIN 12.5 MG: 12.5 TABLET, FILM COATED ORAL at 08:22

## 2023-09-16 RX ADMIN — ACETAMINOPHEN 650 MG: 325 TABLET ORAL at 06:11

## 2023-09-16 RX ADMIN — SODIUM CHLORIDE, PRESERVATIVE FREE 10 ML: 5 INJECTION INTRAVENOUS at 21:24

## 2023-09-16 RX ADMIN — PANTOPRAZOLE SODIUM 40 MG: 40 TABLET, DELAYED RELEASE ORAL at 06:01

## 2023-09-16 RX ADMIN — GABAPENTIN 600 MG: 300 CAPSULE ORAL at 21:16

## 2023-09-16 RX ADMIN — ATORVASTATIN CALCIUM 80 MG: 40 TABLET, FILM COATED ORAL at 21:16

## 2023-09-16 RX ADMIN — INSULIN LISPRO 4 UNITS: 100 INJECTION, SOLUTION INTRAVENOUS; SUBCUTANEOUS at 12:54

## 2023-09-16 RX ADMIN — METOPROLOL TARTRATE 12.5 MG: 25 TABLET, FILM COATED ORAL at 21:16

## 2023-09-16 RX ADMIN — CYCLOBENZAPRINE 10 MG: 10 TABLET, FILM COATED ORAL at 15:13

## 2023-09-16 RX ADMIN — INSULIN GLARGINE 10 UNITS: 100 INJECTION, SOLUTION SUBCUTANEOUS at 21:16

## 2023-09-16 RX ADMIN — CYCLOBENZAPRINE 10 MG: 10 TABLET, FILM COATED ORAL at 06:11

## 2023-09-16 RX ADMIN — INSULIN GLARGINE 10 UNITS: 100 INJECTION, SOLUTION SUBCUTANEOUS at 08:23

## 2023-09-16 ASSESSMENT — PAIN SCALES - GENERAL
PAINLEVEL_OUTOF10: 3
PAINLEVEL_OUTOF10: 2
PAINLEVEL_OUTOF10: 7
PAINLEVEL_OUTOF10: 6

## 2023-09-16 ASSESSMENT — PAIN DESCRIPTION - LOCATION
LOCATION: LEG
LOCATION: LEG

## 2023-09-16 ASSESSMENT — PAIN DESCRIPTION - DESCRIPTORS
DESCRIPTORS: ACHING
DESCRIPTORS: ACHING;SPASM

## 2023-09-16 ASSESSMENT — PAIN DESCRIPTION - ORIENTATION
ORIENTATION: RIGHT
ORIENTATION: RIGHT;LEFT

## 2023-09-16 NOTE — PLAN OF CARE
Problem: Discharge Planning  Goal: Discharge to home or other facility with appropriate resources  Outcome: Progressing     Problem: Safety - Adult  Goal: Free from fall injury  Outcome: Progressing     Problem: Skin/Tissue Integrity  Goal: Absence of new skin breakdown  Description: 1. Monitor for areas of redness and/or skin breakdown  2. Assess vascular access sites hourly  3. Every 4-6 hours minimum:  Change oxygen saturation probe site  4. Every 4-6 hours:  If on nasal continuous positive airway pressure, respiratory therapy assess nares and determine need for appliance change or resting period.   Outcome: Progressing     Problem: Metabolic/Fluid and Electrolytes - Adult  Goal: Electrolytes maintained within normal limits  Outcome: Progressing

## 2023-09-16 NOTE — PLAN OF CARE
Problem: Discharge Planning  Goal: Discharge to home or other facility with appropriate resources  9/16/2023 1448 by Jean-Pierre Bae RN  Outcome: Progressing  Flowsheets (Taken 9/16/2023 1448)  Discharge to home or other facility with appropriate resources:   Identify barriers to discharge with patient and caregiver   Arrange for needed discharge resources and transportation as appropriate   Identify discharge learning needs (meds, wound care, etc)   Refer to discharge planning if patient needs post-hospital services based on physician order or complex needs related to functional status, cognitive ability or social support system  9/16/2023 0109 by Omero Martin RN  Outcome: Progressing     Problem: Safety - Adult  Goal: Free from fall injury  9/16/2023 1448 by Jean-Pierre Bae RN  Outcome: Progressing  Flowsheets (Taken 9/16/2023 1448)  Free From Fall Injury:   Instruct family/caregiver on patient safety   Based on caregiver fall risk screen, instruct family/caregiver to ask for assistance with transferring infant if caregiver noted to have fall risk factors  9/16/2023 0109 by Omero Martin RN  Outcome: Progressing     Problem: Skin/Tissue Integrity  Goal: Absence of new skin breakdown  Description: 1. Monitor for areas of redness and/or skin breakdown  2. Assess vascular access sites hourly  3. Every 4-6 hours minimum:  Change oxygen saturation probe site  4. Every 4-6 hours:  If on nasal continuous positive airway pressure, respiratory therapy assess nares and determine need for appliance change or resting period.   9/16/2023 0109 by Omero Martin RN  Outcome: Progressing     Problem: Metabolic/Fluid and Electrolytes - Adult  Goal: Electrolytes maintained within normal limits  9/16/2023 0109 by Omero Martin RN  Outcome: Progressing     Problem: Pain  Goal: Verbalizes/displays adequate comfort level or baseline comfort level  Outcome: Progressing  Flowsheets (Taken 9/16/2023

## 2023-09-16 NOTE — PROGRESS NOTES
[facility]  Office: [phone]  Madison Dorsey, DO, Milan Ba, DO, Florencia Grover, DO, Macey Rodriguez, DO, Gray Degroot MD, Severo Lopes, MD, Leida Magaña MD, Brenda Carlson MD,  Christy Meneses MD, Sabas Owen MD, Aletha Bray DO, Clara Valencia MD,  Josiah Carolina MD, Mirta Rogers MD, Delilah Dowell DO, Jimbo Moura MD,  Flip Nguyen DO, Marcelino Correa MD, Chantal Smyth MD, Odette Live MD, Karin Mcrae MD,  Olu Tanner MD, Mikhail Holley MD, Eduard Faulkner MD, Venkatesh Ortega MD, Barry Raphael DO, Manisha Gonzalez MD,  Blue Watson MD, Celia Mortimer, MD, Geronimo Smith, CNP,  Maximino Dong, CNP,, Mino Herbert, CNP,  Leonardo Goff, LESA, Nicolas Joy, LANE, Tamy Henriquez, CNP, Heri Arambula, CNP, Marisa Stevens, CNP, Teddy Villafana, CNP, Lucila Oliveros, CNP, Michele Landaverde, CNS, Rene Tyson, [facility], Amanda Hawthorne, [address] [place] [address]    Progress Note    9/16/2023    9:39 AM    Name:   Sara Mcgovern  MRN:     [id number]     Acct:      [de-identified]   Room:   [address] [place] [address] Day:  9  Admit Date:  9/7/2023 10:46 AM    PCP:   PROVIDER UNKNOWN  Code Status:  Full Code    Subjective:     Patient seen in follow-up for cervical decompression, patient states \"I am sore\"    Patient developed some hallucinations yesterday evening. I have discontinued the narcotics and [facility] see if this is narcotic induced. He has had an MRI previously in the admission due to his confusion that did not show any metastatic disease. I suspect that his hallucinations are secondary to medications. The patient does feel that the hallucinations were real at this point in time we will monitor to see if there is any recurrence of this. Meanwhile we are working on disposition. Plans are to eventually go to the [facility] in [place] for acute rehab. No bed is presently available.   There is

## 2023-09-17 LAB
ANION GAP SERPL CALCULATED.3IONS-SCNC: 9 MMOL/L (ref 9–17)
BUN SERPL-MCNC: 24 MG/DL (ref 8–23)
CALCIUM SERPL-MCNC: 9.5 MG/DL (ref 8.6–10.4)
CHLORIDE SERPL-SCNC: 101 MMOL/L (ref 98–107)
CO2 SERPL-SCNC: 27 MMOL/L (ref 20–31)
CREAT SERPL-MCNC: 0.6 MG/DL (ref 0.7–1.2)
ERYTHROCYTE [DISTWIDTH] IN BLOOD BY AUTOMATED COUNT: 14.6 % (ref 12.5–15.4)
GFR SERPL CREATININE-BSD FRML MDRD: >60 ML/MIN/1.73M2
GLUCOSE BLD-MCNC: 107 MG/DL (ref 75–110)
GLUCOSE BLD-MCNC: 81 MG/DL (ref 75–110)
GLUCOSE BLD-MCNC: 89 MG/DL (ref 75–110)
GLUCOSE BLD-MCNC: 93 MG/DL (ref 75–110)
GLUCOSE SERPL-MCNC: 105 MG/DL (ref 70–99)
HCT VFR BLD AUTO: 39 % (ref 41–53)
HGB BLD-MCNC: 13.3 G/DL (ref 13.5–17.5)
MCH RBC QN AUTO: 31.1 PG (ref 26–34)
MCHC RBC AUTO-ENTMCNC: 34.1 G/DL (ref 31–37)
MCV RBC AUTO: 91.3 FL (ref 80–100)
PLATELET # BLD AUTO: 308 K/UL (ref 140–450)
PMV BLD AUTO: 7.9 FL (ref 6–12)
POTASSIUM SERPL-SCNC: 4.1 MMOL/L (ref 3.7–5.3)
RBC # BLD AUTO: 4.27 M/UL (ref 4.5–5.9)
SODIUM SERPL-SCNC: 137 MMOL/L (ref 135–144)
WBC OTHER # BLD: 12.6 K/UL (ref 3.5–11)

## 2023-09-17 PROCEDURE — 80048 BASIC METABOLIC PNL TOTAL CA: CPT

## 2023-09-17 PROCEDURE — 6370000000 HC RX 637 (ALT 250 FOR IP): Performed by: SURGERY

## 2023-09-17 PROCEDURE — 82947 ASSAY GLUCOSE BLOOD QUANT: CPT

## 2023-09-17 PROCEDURE — 6370000000 HC RX 637 (ALT 250 FOR IP): Performed by: CHIROPRACTOR

## 2023-09-17 PROCEDURE — 85027 COMPLETE CBC AUTOMATED: CPT

## 2023-09-17 PROCEDURE — 99232 SBSQ HOSP IP/OBS MODERATE 35: CPT | Performed by: HOSPITALIST

## 2023-09-17 PROCEDURE — 6370000000 HC RX 637 (ALT 250 FOR IP): Performed by: HOSPITALIST

## 2023-09-17 PROCEDURE — 85018 HEMOGLOBIN: CPT

## 2023-09-17 PROCEDURE — 85014 HEMATOCRIT: CPT

## 2023-09-17 PROCEDURE — 2580000003 HC RX 258: Performed by: CHIROPRACTOR

## 2023-09-17 PROCEDURE — 82270 OCCULT BLOOD FECES: CPT

## 2023-09-17 PROCEDURE — 99232 SBSQ HOSP IP/OBS MODERATE 35: CPT | Performed by: INTERNAL MEDICINE

## 2023-09-17 PROCEDURE — 36415 COLL VENOUS BLD VENIPUNCTURE: CPT

## 2023-09-17 PROCEDURE — 1200000000 HC SEMI PRIVATE

## 2023-09-17 RX ORDER — HYDROCODONE BITARTRATE AND ACETAMINOPHEN 5; 325 MG/1; MG/1
2 TABLET ORAL EVERY 4 HOURS PRN
Status: DISCONTINUED | OUTPATIENT
Start: 2023-09-17 | End: 2023-09-22 | Stop reason: HOSPADM

## 2023-09-17 RX ORDER — HYDROCODONE BITARTRATE AND ACETAMINOPHEN 5; 325 MG/1; MG/1
1 TABLET ORAL EVERY 4 HOURS PRN
Status: DISCONTINUED | OUTPATIENT
Start: 2023-09-17 | End: 2023-09-22 | Stop reason: HOSPADM

## 2023-09-17 RX ORDER — QUETIAPINE FUMARATE 25 MG/1
25 TABLET, FILM COATED ORAL 2 TIMES DAILY
Status: DISCONTINUED | OUTPATIENT
Start: 2023-09-17 | End: 2023-09-19

## 2023-09-17 RX ADMIN — SODIUM CHLORIDE, PRESERVATIVE FREE 10 ML: 5 INJECTION INTRAVENOUS at 09:59

## 2023-09-17 RX ADMIN — METOPROLOL TARTRATE 12.5 MG: 25 TABLET, FILM COATED ORAL at 09:54

## 2023-09-17 RX ADMIN — CYCLOBENZAPRINE 10 MG: 10 TABLET, FILM COATED ORAL at 00:26

## 2023-09-17 RX ADMIN — METOPROLOL TARTRATE 12.5 MG: 25 TABLET, FILM COATED ORAL at 21:19

## 2023-09-17 RX ADMIN — GABAPENTIN 600 MG: 300 CAPSULE ORAL at 09:53

## 2023-09-17 RX ADMIN — GABAPENTIN 600 MG: 300 CAPSULE ORAL at 21:19

## 2023-09-17 RX ADMIN — INSULIN GLARGINE 10 UNITS: 100 INJECTION, SOLUTION SUBCUTANEOUS at 09:54

## 2023-09-17 RX ADMIN — SODIUM CHLORIDE, PRESERVATIVE FREE 10 ML: 5 INJECTION INTRAVENOUS at 20:50

## 2023-09-17 RX ADMIN — HYDROCODONE BITARTRATE AND ACETAMINOPHEN 1 TABLET: 5; 325 TABLET ORAL at 19:34

## 2023-09-17 RX ADMIN — ATORVASTATIN CALCIUM 80 MG: 40 TABLET, FILM COATED ORAL at 21:19

## 2023-09-17 RX ADMIN — TAMSULOSIN HYDROCHLORIDE 0.4 MG: 0.4 CAPSULE ORAL at 09:54

## 2023-09-17 RX ADMIN — TRAMADOL HYDROCHLORIDE 50 MG: 50 TABLET, COATED ORAL at 06:23

## 2023-09-17 RX ADMIN — ALOGLIPTIN 12.5 MG: 12.5 TABLET, FILM COATED ORAL at 09:53

## 2023-09-17 RX ADMIN — QUETIAPINE FUMARATE 25 MG: 25 TABLET ORAL at 21:19

## 2023-09-17 RX ADMIN — HYDROCODONE BITARTRATE AND ACETAMINOPHEN 1 TABLET: 5; 325 TABLET ORAL at 09:58

## 2023-09-17 RX ADMIN — QUETIAPINE FUMARATE 25 MG: 25 TABLET ORAL at 10:02

## 2023-09-17 RX ADMIN — PANTOPRAZOLE SODIUM 40 MG: 40 TABLET, DELAYED RELEASE ORAL at 06:23

## 2023-09-17 ASSESSMENT — PAIN SCALES - GENERAL
PAINLEVEL_OUTOF10: 6

## 2023-09-17 ASSESSMENT — PAIN DESCRIPTION - ORIENTATION
ORIENTATION: LEFT;RIGHT
ORIENTATION: LEFT

## 2023-09-17 ASSESSMENT — PAIN DESCRIPTION - DESCRIPTORS
DESCRIPTORS: SPASM
DESCRIPTORS: ACHING

## 2023-09-17 ASSESSMENT — PAIN DESCRIPTION - LOCATION
LOCATION: LEG;BACK
LOCATION: SHOULDER;LEG

## 2023-09-17 NOTE — PROGRESS NOTES
Physical Therapy        Physical Therapy Cancel Note      DATE: 2023    NAME: Yuridia Carmona  MRN: [id number]   : 1950    Chief Complaint   Patient presents with    Numbness     Pt [facility] via EMS with c/o extremity N x 4 since yesterday at 4am.  Pt also c/o having speech issues yesterday and not feeling \"mentally\" right. Decided to come to ED this am (30 hours later) due to numbness in upper and lower limbs. Pt is A/O, cooperative, speech clear. Patient not seen this date for Physical Therapy due to:    Patient Declined: Pt declined, nursing notified.        Electronically signed by Cele Perez PT on 2023 at 3:09 PM

## 2023-09-17 NOTE — PROGRESS NOTES
[facility]  Office: [phone]  Guera , DO, Gregoria Perez, DO, Zeynep Santoro Blood, DO, Patrick Alvarado MD, Marc Knight MD, Ghada Mijares MD, Ramon Ortiz MD,  Aisha Chaidez MD, Earnest Woodruff MD, French Lamp, DO, Dimas Hennessy MD,  Celine Johnson MD, Sil Ochoa MD, Lisa Jones DO, Savanna Fraser MD,  Shyla Champagne MD, Cass Jimenez MD, Juan Daniel Suresh MD, Laura Barnett MD,  Sylvester Ross MD, Valerio Mercado MD, Sunni Burns MD, Allen Cabral MD, Lisa Gibson, , Rasta Simons MD,  Mariana Adkins MD, Dominguez Bowman MD, Allie Grover, CNP,  Siobhan Montes De Oca, CNP,, Coby Reynoso, CNP,  Kailash Ramirez, LESA, Comfort Finch, CNP, Will White, CNP, Cynthia Rocha, CNP, Lizzette Garner, CNP, Cristopher Medina, CNP, Karlos Reynoso, CNP, Roxana Bills, CNS, Barbara Duran, [facility], Shaniqua Xie, [address] [place] [address]    Progress Note    9/17/2023    9:31 AM    Name:   Yuridia Carmona  MRN:     [id number]     Acct:      [de-identified]   Room:   [address] [place] [address] Day:  10  Admit Date:  9/7/2023 10:46 AM    PCP:   PROVIDER UNKNOWN  Code Status:  Full Code    Subjective:     Patient seen in follow-up for confusion, weakness. Patient states \"I had nightmares last night\"    Patient is more confused today. Patient has a plethora of stories events that clearly did not occur yesterday evening. Many of these are so outlandish that they are not even worth repeating. The patient did have an MRI at the beginning of the admission due to confusion which was negative for metastatic disease. At this point in time I suspect that his confusion is multifactorial from underlying disease process, narcotics and an element of hospital psychosis. Going to start him on Seroquel and monitor his clinical response. Meanwhile [facility]   He can be 0.6*   ANIONGAP 9   LABGLOM >60   CALCIUM 9.5     Recent Labs     09/15/23  2012 09/16/23  0750 09/16/23  1154 09/16/23  1716 09/16/23  1942 09/17/23  0915   POCGLU 279* 176* 228* 192* 203* 107     ABG:No results found for: \"POCPH\", \"PHART\", \"PH\", \"POCPCO2\", \"CCZ3VYS\", \"PCO2\", \"POCPO2\", \"PO2ART\", \"PO2\", \"POCHCO3\", \"POX6IMO\", \"HCO3\", \"NBEA\", \"PBEA\", \"BEART\", \"BE\", \"THGBART\", \"THB\", \"IDM2YIF\", \"KBUG8NGY\", \"B8RGIEHO\", \"O2SAT\", \"FIO2\"  No results found for: \"SPECIAL\"  No results found for: \"CULTURE\"    Radiology:  NM BONE SCAN WHOLE BODY    Result Date: 9/11/2023  1. Uptake in multiple cervical, thoracic, and lumbar vertebrae which could be due to degenerative change or potentially metastatic disease. This can be further evaluated with the PET-CT scan as recommended for the right upper lobe irregular nodule described on the prior CT scan when patient is able to receive the exam. 2.  The urinary bladder is very distended and there is prominence of the bilateral renal collecting systems, potentially due to a bladder outlet obstruction.        Physical Examination:     General appearance:  alert, cooperative and no distress  Mental Status:  Patient is oriented but markedly confused  Lungs:  clear to auscultation bilaterally, normal effort  Heart:  regular rate and rhythm, no murmur  Abdomen:  soft, nontender, nondistended, normal bowel sounds, no masses, hepatomegaly, splenomegaly  Extremities: Right below the knee amputation noted, no clubbing or cyanosis or edema noted in the left  Skin:  no gross lesions, rashes, induration    Assessment:     Hospital Problems             Last Modified POA    * (Principal) Altered mental status 9/7/2023 Yes    Generalized weakness 9/7/2023 Yes    Below knee amputation ([address]) 9/7/2023 Yes    CAD (coronary artery disease), native coronary artery 9/7/2023 Yes    Overview Signed 9/7/2023  6:32 PM by Macario Stewart MD     Formatting of this note might be different from the original.  1. One

## 2023-09-17 NOTE — PROGRESS NOTES
(coronary artery disease), Falls, Myocardial infarction [facility]), PVD (peripheral vascular disease) ([facility]), and Urinary retention. Past Surgical History:   has a past surgical history that includes Leg amputation below knee (Right); Cardiac surgery; Cervical spine surgery (N/A, 09/14/2023); and cervical fusion (N/A, 9/14/2023). Family History: family history is not on file. Social History:   reports that he has been smoking cigarettes. He does not have any smokeless tobacco history on file. He reports that he does not currently use alcohol. He reports that he does not currently use drugs. Medications:    Prior to Admission medications    Medication Sig Start Date End Date Taking? Authorizing Provider   insulin aspart (NOVOLOG FLEXPEN) 100 UNIT/ML injection pen Inject into the skin 3 times daily (before meals) Sliding scale   Yes Historical Provider, MD   gabapentin (NEURONTIN) 600 MG tablet Take 1 tablet by mouth 2 times daily. Yes Historical Provider, MD   HYDROcodone-acetaminophen (NORCO) 5-325 MG per tablet Take 1 tablet by mouth every 4 hours as needed for Pain.  Max Daily Amount: 6 tablets   Yes Historical Provider, MD   cyclobenzaprine (FLEXERIL) 5 MG tablet Take 1 tablet by mouth 2 times daily as needed for Muscle spasms   Yes Historical Provider, MD   tamsulosin (FLOMAX) 0.4 MG capsule Take 1 capsule by mouth daily   Yes Historical Provider, MD   alogliptin (NESINA) 12.5 MG TABS tablet Take 1 tablet by mouth daily   Yes Historical Provider, MD   folic acid (FOLVITE) 1 MG tablet Take 1 tablet by mouth daily   Yes Historical Provider, MD   aspirin 81 MG EC tablet Take 1 tablet by mouth daily   Yes Historical Provider, MD   nicotine (NICODERM CQ) Place 1 patch onto the skin daily 21mg/24hr   Yes Historical Provider, MD   carvedilol (COREG) 12.5 MG tablet Take 1 tablet by mouth 2 times daily (with meals)   Yes Historical Provider, MD   atorvastatin (LIPITOR) 80 MG tablet Take 1 tablet by mouth daily   Yes patient's goals of care and treatment options. He will finalize his decision after review of pathology if possible. So if no biopsy is done from the recent surgery we will then consider lung biopsy. We are suspicious that he has metastatic lung cancer with bone metastases and we will talk to him about systemic therapy after recovery  He will most likely need radiation after  recovery/rehab  Further systemic treatment will be based on final pathology results. Pain control and supportive care   Patient's questions were answered to the best of his satisfaction and he verbalized full understanding and agreement. Lucia Martinez MD, MD                                               This note is created with the assistance of a speech recognition program.  While intending to generate a document that actually reflects the content of the visit, the document can still have some errors including those of syntax and sound a like substitutions which may escape proof reading. It such instances, actual meaning can be extrapolated by contextual diversion.

## 2023-09-17 NOTE — PLAN OF CARE
Problem: Discharge Planning  Goal: Discharge to home or other facility with appropriate resources  9/17/2023 0049 by Seema Chavez RN  Outcome: Progressing     Problem: Safety - Adult  Goal: Free from fall injury  9/17/2023 0049 by Seema Chavez RN  Outcome: Progressing     Problem: Skin/Tissue Integrity  Goal: Absence of new skin breakdown  Description: 1. Monitor for areas of redness and/or skin breakdown  2. Assess vascular access sites hourly  3. Every 4-6 hours minimum:  Change oxygen saturation probe site  4. Every 4-6 hours:  If on nasal continuous positive airway pressure, respiratory therapy assess nares and determine need for appliance change or resting period.   Outcome: Progressing     Problem: Metabolic/Fluid and Electrolytes - Adult  Goal: Electrolytes maintained within normal limits  Outcome: Progressing     Problem: Metabolic/Fluid and Electrolytes - Adult  Goal: Hemodynamic stability and optimal renal function maintained  Outcome: Progressing     Problem: Chronic Conditions and Co-morbidities  Goal: Patient's chronic conditions and co-morbidity symptoms are monitored and maintained or improved  Outcome: Progressing     Problem: Pain  Goal: Verbalizes/displays adequate comfort level or baseline comfort level  9/17/2023 0049 by Seema Chavez RN  Outcome: Progressing     Problem: ABCDS Injury Assessment  Goal: Absence of physical injury  Outcome: Progressing     Problem: Nutrition Deficit:  Goal: Optimize nutritional status  Outcome: Progressing

## 2023-09-17 NOTE — PROGRESS NOTES
Occupational [address]  Occupational Therapy Not Seen Note    DATE: 2023    NAME: Esha Dickerson  MRN: [id number]   : 1950      Patient not seen this date for Occupational Therapy due to:       Other: RN reports pt with confusion/agitation this AM and reports pt not appropriate for therapy at this time      Next Scheduled Treatment: Will check back PM if able or 2023    Electronically signed by Alvaro Morales OT on 2023 at 10:52 AM

## 2023-09-18 PROBLEM — F05 DELIRIUM DUE TO ANOTHER MEDICAL CONDITION: Status: ACTIVE | Noted: 2023-09-18

## 2023-09-18 LAB
ANION GAP SERPL CALCULATED.3IONS-SCNC: 10 MMOL/L (ref 9–17)
BUN SERPL-MCNC: 23 MG/DL (ref 8–23)
CALCIUM SERPL-MCNC: 8.9 MG/DL (ref 8.6–10.4)
CHLORIDE SERPL-SCNC: 100 MMOL/L (ref 98–107)
CO2 SERPL-SCNC: 26 MMOL/L (ref 20–31)
CREAT SERPL-MCNC: 0.8 MG/DL (ref 0.7–1.2)
DATE, STOOL #1: ABNORMAL
ERYTHROCYTE [DISTWIDTH] IN BLOOD BY AUTOMATED COUNT: 14.2 % (ref 12.5–15.4)
GFR SERPL CREATININE-BSD FRML MDRD: >60 ML/MIN/1.73M2
GLUCOSE BLD-MCNC: 102 MG/DL (ref 75–110)
GLUCOSE BLD-MCNC: 123 MG/DL (ref 75–110)
GLUCOSE BLD-MCNC: 142 MG/DL (ref 75–110)
GLUCOSE SERPL-MCNC: 100 MG/DL (ref 70–99)
HCT VFR BLD AUTO: 37.1 % (ref 41–53)
HCT VFR BLD AUTO: 37.3 % (ref 41–53)
HCT VFR BLD AUTO: 37.6 % (ref 41–53)
HCT VFR BLD AUTO: 37.6 % (ref 41–53)
HCT VFR BLD AUTO: 39.4 % (ref 41–53)
HCT VFR BLD AUTO: 39.7 % (ref 41–53)
HEMOCCULT SP1 STL QL: POSITIVE
HGB BLD-MCNC: 12.6 G/DL (ref 13.5–17.5)
HGB BLD-MCNC: 12.6 G/DL (ref 13.5–17.5)
HGB BLD-MCNC: 12.7 G/DL (ref 13.5–17.5)
HGB BLD-MCNC: 12.7 G/DL (ref 13.5–17.5)
HGB BLD-MCNC: 12.8 G/DL (ref 13.5–17.5)
HGB BLD-MCNC: 13.1 G/DL (ref 13.5–17.5)
MCH RBC QN AUTO: 30.4 PG (ref 26–34)
MCHC RBC AUTO-ENTMCNC: 33.7 G/DL (ref 31–37)
MCV RBC AUTO: 90.1 FL (ref 80–100)
PLATELET # BLD AUTO: 316 K/UL (ref 140–450)
PMV BLD AUTO: 7.5 FL (ref 6–12)
POTASSIUM SERPL-SCNC: 4.2 MMOL/L (ref 3.7–5.3)
RBC # BLD AUTO: 4.17 M/UL (ref 4.5–5.9)
SODIUM SERPL-SCNC: 136 MMOL/L (ref 135–144)
TIME, STOOL #1: 1445
WBC OTHER # BLD: 11.4 K/UL (ref 3.5–11)

## 2023-09-18 PROCEDURE — 82947 ASSAY GLUCOSE BLOOD QUANT: CPT

## 2023-09-18 PROCEDURE — 85027 COMPLETE CBC AUTOMATED: CPT

## 2023-09-18 PROCEDURE — 6370000000 HC RX 637 (ALT 250 FOR IP): Performed by: HOSPITALIST

## 2023-09-18 PROCEDURE — 99213 OFFICE O/P EST LOW 20 MIN: CPT

## 2023-09-18 PROCEDURE — 80048 BASIC METABOLIC PNL TOTAL CA: CPT

## 2023-09-18 PROCEDURE — 97535 SELF CARE MNGMENT TRAINING: CPT

## 2023-09-18 PROCEDURE — 6370000000 HC RX 637 (ALT 250 FOR IP): Performed by: CHIROPRACTOR

## 2023-09-18 PROCEDURE — 99232 SBSQ HOSP IP/OBS MODERATE 35: CPT | Performed by: INTERNAL MEDICINE

## 2023-09-18 PROCEDURE — 87205 SMEAR GRAM STAIN: CPT

## 2023-09-18 PROCEDURE — 36415 COLL VENOUS BLD VENIPUNCTURE: CPT

## 2023-09-18 PROCEDURE — 1200000000 HC SEMI PRIVATE

## 2023-09-18 PROCEDURE — 97530 THERAPEUTIC ACTIVITIES: CPT

## 2023-09-18 PROCEDURE — 6370000000 HC RX 637 (ALT 250 FOR IP): Performed by: SURGERY

## 2023-09-18 PROCEDURE — 85018 HEMOGLOBIN: CPT

## 2023-09-18 PROCEDURE — 85014 HEMATOCRIT: CPT

## 2023-09-18 PROCEDURE — 99232 SBSQ HOSP IP/OBS MODERATE 35: CPT | Performed by: HOSPITALIST

## 2023-09-18 PROCEDURE — 2580000003 HC RX 258: Performed by: CHIROPRACTOR

## 2023-09-18 RX ADMIN — METOPROLOL TARTRATE 12.5 MG: 25 TABLET, FILM COATED ORAL at 09:03

## 2023-09-18 RX ADMIN — QUETIAPINE FUMARATE 25 MG: 25 TABLET ORAL at 22:35

## 2023-09-18 RX ADMIN — QUETIAPINE FUMARATE 25 MG: 25 TABLET ORAL at 09:03

## 2023-09-18 RX ADMIN — SODIUM CHLORIDE, PRESERVATIVE FREE 10 ML: 5 INJECTION INTRAVENOUS at 09:02

## 2023-09-18 RX ADMIN — TAMSULOSIN HYDROCHLORIDE 0.4 MG: 0.4 CAPSULE ORAL at 09:03

## 2023-09-18 RX ADMIN — GABAPENTIN 600 MG: 300 CAPSULE ORAL at 22:35

## 2023-09-18 RX ADMIN — SODIUM CHLORIDE, PRESERVATIVE FREE 10 ML: 5 INJECTION INTRAVENOUS at 21:00

## 2023-09-18 RX ADMIN — GABAPENTIN 600 MG: 300 CAPSULE ORAL at 09:03

## 2023-09-18 RX ADMIN — PANTOPRAZOLE SODIUM 40 MG: 40 TABLET, DELAYED RELEASE ORAL at 09:03

## 2023-09-18 RX ADMIN — ALOGLIPTIN 12.5 MG: 12.5 TABLET, FILM COATED ORAL at 09:03

## 2023-09-18 RX ADMIN — ATORVASTATIN CALCIUM 80 MG: 40 TABLET, FILM COATED ORAL at 22:35

## 2023-09-18 ASSESSMENT — PAIN SCALES - GENERAL: PAINLEVEL_OUTOF10: 5

## 2023-09-18 ASSESSMENT — PAIN DESCRIPTION - DESCRIPTORS: DESCRIPTORS: ACHING;DISCOMFORT;SORE

## 2023-09-18 ASSESSMENT — PAIN DESCRIPTION - LOCATION: LOCATION: GENERALIZED

## 2023-09-18 NOTE — PROGRESS NOTES
Occupational Therapy  Facility/Department: [facility] PROGRESSIVE CARE  Occupational Therapy Daily Treatment Note     Name: Sofi Alaniz  : 1950  MRN: [id number]  Date of Service: 2023    Discharge Recommendations:  Patient would benefit from continued therapy after discharge  OT Equipment Recommendations  Other: continue to assess       Patient Diagnosis(es): The primary encounter diagnosis was Elevated troponin. Diagnoses of Generalized weakness and Numbness were also pertinent to this visit. Past Medical History:  has a past medical history of CAD (coronary artery disease), Falls, Myocardial infarction ([address]), PVD (peripheral vascular disease) ([address]), and Urinary retention. Past Surgical History:  has a past surgical history that includes Leg amputation below knee (Right); Cardiac surgery; Cervical spine surgery (N/A, 2023); and cervical fusion (N/A, 2023). Assessment   Performance deficits / Impairments: Decreased functional mobility ; Decreased safe awareness;Decreased balance;Decreased coordination;Decreased ROM; Decreased endurance;Decreased ADL status; Decreased strength  Assessment: Pt progressing slowly towards STGs. . Pt max a for rolling in bed to reposition linen. Dep for bed pan use and hygiene. Max assist to dep for feeding, hand over hand assist.  Pt would benefit from continued therapy services during hospitalization and following discharge to address functional deficits listed above. Prognosis: Fair  REQUIRES OT FOLLOW-UP: Yes  Activity Tolerance  Activity Tolerance: Patient limited by fatigue        Plan   Occupational Therapy Plan  Times Per Week: 5-6x/wk  Times Per Day:  Once a day  Current Treatment Recommendations: Strengthening, ROM, Balance training, Functional mobility training, Endurance training, Neuromuscular re-education, Safety education & training, Patient/Caregiver education & training, Equipment evaluation, education, & procurement, Self-Care /

## 2023-09-18 NOTE — PROGRESS NOTES
Physical Therapy  Facility/Department: [facility] PROGRESSIVE CARE  Physical Therapy Daily Documentation Note    Name: Tara Wright  : 1950  MRN: [id number]  Date of Service: 2023    Discharge Recommendations:  Patient would benefit from continued therapy after discharge   PT Equipment Recommendations  Other: continued assessment of equipment with continued therapy      Patient Diagnosis(es): The primary encounter diagnosis was Elevated troponin. Diagnoses of Generalized weakness and Numbness were also pertinent to this visit. Past Medical History:  has a past medical history of CAD (coronary artery disease), Falls, Myocardial infarction ([address]), PVD (peripheral vascular disease) ([address]), and Urinary retention. Past Surgical History:  has a past surgical history that includes Leg amputation below knee (Right); Cardiac surgery; Cervical spine surgery (N/A, 2023); and cervical fusion (N/A, 2023). Assessment   Body Structures, Functions, Activity Limitations Requiring Skilled Therapeutic Intervention: Decreased functional mobility ; Decreased ROM; Decreased strength;Decreased safe awareness;Decreased endurance  Assessment: On  pt underwent C3-5 decompression and PLIF. Pt has post op cervical collar. Pt required dependent of 2 for bed mobility, pt has sat on side of bed for 18' with max of one (occasional min assist). Pt has performed seated LE exs and stretching. Pt not appropriate to return to independent living. Pt will benefit from continued PT for strengthening, balance, endurance, stretching and functional mobility training while in the hospital and at discharge. Specific Instructions for Next Treatment: cervical collar for all activity  Therapy Prognosis: Good  Requires PT Follow-Up: Yes  Activity Tolerance  Activity Tolerance: Patient limited by pain; Patient limited by fatigue;Patient limited by endurance  Activity Tolerance Comments: Required reposition of Aspen collar and toileting. Plan   Physcial Therapy Plan  General Plan: 3-5 times per week  Specific Instructions for Next Treatment: cervical collar for all activity  Current Treatment Recommendations: Strengthening, ROM, Balance training, Functional mobility training, Transfer training, Endurance training, Wheelchair mobility training, Neuromuscular re-education, Therapeutic activities, Home exercise program, Safety education & training, Equipment evaluation, education, & procurement, Positioning, Pain management  Safety Devices  Type of Devices: Bed alarm in place, Left in bed, Patient at risk for falls, Nurse notified, All fall risk precautions in place  Restraints  Restraints Initially in Place: No     Restrictions  Restrictions/Precautions  Restrictions/Precautions: Fall Risk, Bed Alarm  Required Braces or Orthoses?: No  Position Activity Restriction  Other position/activity restrictions: up with assistance; R BKA (knee flexion contracture); weak chiara hand , cervical lami and decompression 9/14, Aspen cervical collar     Subjective   General  Patient assessed for rehabilitation services?: Yes  Response To Previous Treatment: Patient with no complaints from previous session. Family / Caregiver Present: No  Follows Commands: Within Functional Limits  General Comment  Comments: On 9/14 pt underwent C3-5 laminectomy and PLIF per Dr. Romel Todd. Subjective  Subjective: RN & pt agreeable to therapy. Pt supine in bed upon arrival and request toileting for BM. Pt rates upper back and shoulder pain 8/10. Notified RN. Pt agreeable to chiara upper back/scapular cold packs under slide sheet under pt after therapy. Aspen cervical collar adjusted while pt in bed for better alignment and pt comfort.          Cognition   Orientation  Overall Orientation Status: Within Functional Limits  Orientation Level: Oriented to person  Cognition  Overall Cognitive Status: Exceptions  Arousal/Alertness: Delayed responses to stimuli  Following

## 2023-09-18 NOTE — CARE COORDINATION
Writer spoke with sister Judd Malone regarding patients desire to return home and then transfer to [facility] in [place] with family transporting. Cainelisa Kira denies ability to transfer patient to [facility]. Judd Malone stated that patient has received a letter approving him to return to [facility] [place]. Updated sister that patient currently has a AMS and this is being evaluated.

## 2023-09-18 NOTE — PLAN OF CARE
Problem: Safety - Adult  Goal: Free from fall injury  9/18/2023 1026 by Kali Love RN  Outcome: Progressing  Pt fall risk, fall band present, falling star, safety alarm activated and in use as needed. Hourly rounding performed. Pt encouraged to use call light. See Christian Matute fall risk assessment.

## 2023-09-18 NOTE — PROGRESS NOTES
[facility]  Office: [phone]  Suraj Mayers, DO, Brianne Hdaley, DO, Dc Yin, DO, Jhon Rodriguez, DO, Mynor Su MD, Mary Ward MD, Marifer Salas MD, Daniella Gusman MD,  Chelsea Haas MD, Bay Nance MD, Nakia Kessler DO, Dione Cortez MD,  Rishabh Mendosa MD, Jimenez Garnica MD, Georgia Lopez DO, Ryan Hogan MD,  Isidoro Barnhart DO, Fabio Stahl MD, Brenda Palacios MD, Shawn Bagley MD, Sebastian Greenberg MD,  Tamra Oakley MD, Lilian Mark MD, Karine Hyde MD, Alina Allen MD, Rabon Kocher, DO, Milton Cruz MD,  Brennen Sotelo MD, Fatemeh Fajardo MD, Malcolm Chen, CNP,  Mj Perez, CNP,, Bartolome Alford, LANE,  Arlyn Haas, LESA, Geneva Richard, CNP, Emile Joe, CNP, Nigel Arambula, CNP, Concha Rivera, CNP, Sun Matamoros, CNP, Liliana Acharya, CNP, Catarina Cortes, CNS, Dulce Hernandez, LANE, Ashly Goff, [address] [place] [address]    Progress Note    9/18/2023    11:11 AM    Name:   Satira Schwab  MRN:     [id number]     Acct:      [de-identified]   Room:   [address] [place] [address] Day:  11  Admit Date:  9/7/2023 10:46 AM    PCP:   PROVIDER UNKNOWN  Code Status:  Full Code    Subjective:     Patient seen in follow-up for confusion, cervical stenosis, chief complaint not obtainable as patient is altered. Patient easily awakened but is quite confused. He does not know that he is in the hospital.  He as mentioned has been more confused over the last couple of days. I suspect this may be hospital induced psychosis given the fact that the patient had a negative MRI on admission. He has a minimal leukocytosis but was given steroids perioperatively. I am not convinced that the patient has an infection at this point in time. I did initiate Seroquel yesterday with minimal response. I am going to consult psychiatry for recommendations.   Unfortunately the patient is not able to maintain any type of

## 2023-09-18 NOTE — PLAN OF CARE
Problem: Discharge Planning  Goal: Discharge to home or other facility with appropriate resources  Outcome: Progressing  Flowsheets (Taken 9/17/2023 2000)  Discharge to home or other facility with appropriate resources: Identify barriers to discharge with patient and caregiver     Problem: Safety - Adult  Goal: Free from fall injury  Outcome: Progressing     Problem: Skin/Tissue Integrity  Goal: Absence of new skin breakdown  Description: 1. Monitor for areas of redness and/or skin breakdown  2. Assess vascular access sites hourly  3. Every 4-6 hours minimum:  Change oxygen saturation probe site  4. Every 4-6 hours:  If on nasal continuous positive airway pressure, respiratory therapy assess nares and determine need for appliance change or resting period.   Outcome: Progressing     Problem: Metabolic/Fluid and Electrolytes - Adult  Goal: Electrolytes maintained within normal limits  Outcome: Progressing  Flowsheets (Taken 9/17/2023 2000)  Electrolytes maintained within normal limits:   Monitor labs and assess patient for signs and symptoms of electrolyte imbalances   Administer electrolyte replacement as ordered  Goal: Hemodynamic stability and optimal renal function maintained  Outcome: Progressing  Flowsheets (Taken 9/17/2023 2000)  Hemodynamic stability and optimal renal function maintained:   Monitor labs and assess for signs and symptoms of volume excess or deficit   Monitor intake, output and patient weight     Problem: Chronic Conditions and Co-morbidities  Goal: Patient's chronic conditions and co-morbidity symptoms are monitored and maintained or improved  Outcome: Progressing  Flowsheets (Taken 9/17/2023 2000)  Care Plan - Patient's Chronic Conditions and Co-Morbidity Symptoms are Monitored and Maintained or Improved: Monitor and assess patient's chronic conditions and comorbid symptoms for stability, deterioration, or improvement     Problem: Pain  Goal: Verbalizes/displays adequate comfort level or baseline comfort level  Outcome: Progressing  Flowsheets (Taken 9/17/2023 2024)  Verbalizes/displays adequate comfort level or baseline comfort level: Encourage patient to monitor pain and request assistance     Problem: ABCDS Injury Assessment  Goal: Absence of physical injury  Outcome: Progressing     Problem: Nutrition Deficit:  Goal: Optimize nutritional status  Outcome: Progressing

## 2023-09-18 NOTE — CONSULTS
Department of Psychiatry  Consult Service   Psychiatric Assessment        REASON FOR CONSULT: Psychosis    CONSULTING PHYSICIAN: Faviola Myers MD    History obtained from: EHR, nursing staff, patient    HISTORY OF PRESENT ILLNESS:          The patient is a 81 y.o. male with no psychiatric history who is admitted medically s/p C3-4 posterior cervical decompression and C3-4 and C4-5 posterior fusion. Staff reports that patient has been consistently on his call light, they report that he has been hallucinating and having delusional thoughts. They report that he believes that he was fishing earlier today. He was placed on Seroquel twice daily, his nurse reports that he did better this morning after receiving that medication. He continues to remain irritable throughout the day. Just prior to today's evaluation nursing staff did remove his Aviles catheter. Prior to its placement he was struggling with urinary retention, they will continue to evaluate for urinary output now that the Aviles has been removed. He is observed lying supine in bed, he does have a cervical collar in place. His dinner tray is sitting bedside, it does not appear that he has eaten much. This author did observe his lunch tray which also appears mostly untouched. He engaged with this author minimally, his voice was weak, whisper like but he was able to identify that he is in [place], [place]. He responds to his name however would not verbally provide a name or date of birth for myself or the  that came to do a blood draw. He was cooperative with the blood draw though did identify that it was painful. He is difficult to elicit any information from to do a completely reliable mental status exam.  Staff reports that he did require assistance eating his lunch. They also report that he was cooperative participating with PT/OT.   Based on the limited participation in today's evaluation, it is difficult to get a full appreciation of cephALEXin (KEFLEX) 500 MG capsule, Take 1 capsule by mouth 3 times daily  HYDROcodone-acetaminophen (NORCO) 5-325 MG per tablet, Take 1 tablet by mouth every 4 hours as needed for Pain. Max Daily Amount: 6 tablets  cyclobenzaprine (FLEXERIL) 5 MG tablet, Take 1 tablet by mouth 2 times daily as needed for Muscle spasms  tamsulosin (FLOMAX) 0.4 MG capsule, Take 1 capsule by mouth daily  alogliptin (NESINA) 12.5 MG TABS tablet, Take 1 tablet by mouth daily  folic acid (FOLVITE) 1 MG tablet, Take 1 tablet by mouth daily  aspirin 81 MG EC tablet, Take 1 tablet by mouth daily  nicotine (NICODERM CQ), Place 1 patch onto the skin daily 21mg/24hr  carvedilol (COREG) 12.5 MG tablet, Take 1 tablet by mouth 2 times daily (with meals)  atorvastatin (LIPITOR) 80 MG tablet, Take 1 tablet by mouth daily  apixaban (ELIQUIS) 5 MG TABS tablet, Take by mouth 2 times daily  omeprazole (PRILOSEC) 20 MG delayed release capsule, Take 1 capsule by mouth daily  melatonin 5 MG TABS tablet, Take 1 tablet by mouth daily    Allergies:  Ciprofloxacin    Lifetime Psychiatric Review of Systems         Obsessions and Compulsions: Unknown     Tamiko or Hypomania: Unknown     Hallucinations: Unknown     Panic Attacks: Unknown     Delusions: Unknown     Phobias: Unknown     Trauma: Unknown    Prior to Admission medications    Medication Sig Start Date End Date Taking? Authorizing Provider   insulin aspart (NOVOLOG FLEXPEN) 100 UNIT/ML injection pen Inject into the skin 3 times daily (before meals) Sliding scale   Yes Historical Provider, MD   gabapentin (NEURONTIN) 600 MG tablet Take 1 tablet by mouth 2 times daily. Yes Historical Provider, MD   HYDROcodone-acetaminophen (NORCO) 5-325 MG per tablet Take 1 tablet by mouth every 4 hours as needed for Pain.  Max Daily Amount: 6 tablets   Yes Historical Provider, MD   cyclobenzaprine (FLEXERIL) 5 MG tablet Take 1 tablet by mouth 2 times daily as needed for Muscle spasms   Yes Historical Provider, MD

## 2023-09-18 NOTE — PROGRESS NOTES
Writer assisted Linsey wound care nurse to give cervical collar care. Cleansed under collar and assessed skin. See LDA's. Plans to get a new cervical collar and Linsey spoke to Dr. Earl Fritz. See order. Waffle mattress placed as well.

## 2023-09-19 ENCOUNTER — APPOINTMENT (OUTPATIENT)
Dept: CT IMAGING | Age: 73
End: 2023-09-19
Payer: COMMERCIAL

## 2023-09-19 PROBLEM — C79.51 CANCER, METASTATIC TO BONE (HCC): Status: ACTIVE | Noted: 2023-09-19

## 2023-09-19 LAB
GLUCOSE BLD-MCNC: 127 MG/DL (ref 75–110)
GLUCOSE BLD-MCNC: 161 MG/DL (ref 75–110)
GLUCOSE BLD-MCNC: 167 MG/DL (ref 75–110)
HCT VFR BLD AUTO: 38.8 % (ref 41–53)
HGB BLD-MCNC: 13 G/DL (ref 13.5–17.5)
MICROORGANISM/AGENT SPEC: NORMAL
MICROORGANISM/AGENT SPEC: NORMAL
SPECIMEN DESCRIPTION: NORMAL

## 2023-09-19 PROCEDURE — 2580000003 HC RX 258: Performed by: STUDENT IN AN ORGANIZED HEALTH CARE EDUCATION/TRAINING PROGRAM

## 2023-09-19 PROCEDURE — 99232 SBSQ HOSP IP/OBS MODERATE 35: CPT | Performed by: INTERNAL MEDICINE

## 2023-09-19 PROCEDURE — 85018 HEMOGLOBIN: CPT

## 2023-09-19 PROCEDURE — 82947 ASSAY GLUCOSE BLOOD QUANT: CPT

## 2023-09-19 PROCEDURE — 6370000000 HC RX 637 (ALT 250 FOR IP): Performed by: HOSPITALIST

## 2023-09-19 PROCEDURE — 2580000003 HC RX 258: Performed by: CHIROPRACTOR

## 2023-09-19 PROCEDURE — 51701 INSERT BLADDER CATHETER: CPT

## 2023-09-19 PROCEDURE — 1200000000 HC SEMI PRIVATE

## 2023-09-19 PROCEDURE — 85014 HEMATOCRIT: CPT

## 2023-09-19 PROCEDURE — 71260 CT THORAX DX C+: CPT

## 2023-09-19 PROCEDURE — 99233 SBSQ HOSP IP/OBS HIGH 50: CPT | Performed by: NURSE PRACTITIONER

## 2023-09-19 PROCEDURE — 6370000000 HC RX 637 (ALT 250 FOR IP): Performed by: STUDENT IN AN ORGANIZED HEALTH CARE EDUCATION/TRAINING PROGRAM

## 2023-09-19 PROCEDURE — 6370000000 HC RX 637 (ALT 250 FOR IP): Performed by: SURGERY

## 2023-09-19 PROCEDURE — 99232 SBSQ HOSP IP/OBS MODERATE 35: CPT | Performed by: STUDENT IN AN ORGANIZED HEALTH CARE EDUCATION/TRAINING PROGRAM

## 2023-09-19 PROCEDURE — 36415 COLL VENOUS BLD VENIPUNCTURE: CPT

## 2023-09-19 PROCEDURE — 6370000000 HC RX 637 (ALT 250 FOR IP): Performed by: CHIROPRACTOR

## 2023-09-19 PROCEDURE — 51798 US URINE CAPACITY MEASURE: CPT

## 2023-09-19 PROCEDURE — 6360000004 HC RX CONTRAST MEDICATION: Performed by: STUDENT IN AN ORGANIZED HEALTH CARE EDUCATION/TRAINING PROGRAM

## 2023-09-19 RX ORDER — 0.9 % SODIUM CHLORIDE 0.9 %
80 INTRAVENOUS SOLUTION INTRAVENOUS ONCE
Status: DISCONTINUED | OUTPATIENT
Start: 2023-09-19 | End: 2023-09-22 | Stop reason: HOSPADM

## 2023-09-19 RX ORDER — QUETIAPINE FUMARATE 25 MG/1
25 TABLET, FILM COATED ORAL NIGHTLY
Status: DISCONTINUED | OUTPATIENT
Start: 2023-09-19 | End: 2023-09-22 | Stop reason: HOSPADM

## 2023-09-19 RX ORDER — QUETIAPINE FUMARATE 25 MG/1
12.5 TABLET, FILM COATED ORAL DAILY
Status: DISCONTINUED | OUTPATIENT
Start: 2023-09-19 | End: 2023-09-22 | Stop reason: HOSPADM

## 2023-09-19 RX ORDER — SODIUM CHLORIDE 0.9 % (FLUSH) 0.9 %
10 SYRINGE (ML) INJECTION 2 TIMES DAILY
Status: DISCONTINUED | OUTPATIENT
Start: 2023-09-19 | End: 2023-09-22 | Stop reason: HOSPADM

## 2023-09-19 RX ORDER — CYCLOBENZAPRINE HCL 10 MG
5 TABLET ORAL 3 TIMES DAILY PRN
Status: DISCONTINUED | OUTPATIENT
Start: 2023-09-19 | End: 2023-09-22 | Stop reason: HOSPADM

## 2023-09-19 RX ORDER — QUETIAPINE FUMARATE 25 MG/1
25 TABLET, FILM COATED ORAL DAILY
Status: DISCONTINUED | OUTPATIENT
Start: 2023-09-19 | End: 2023-09-19

## 2023-09-19 RX ADMIN — QUETIAPINE FUMARATE 25 MG: 25 TABLET ORAL at 21:37

## 2023-09-19 RX ADMIN — SODIUM CHLORIDE, PRESERVATIVE FREE 10 ML: 5 INJECTION INTRAVENOUS at 08:45

## 2023-09-19 RX ADMIN — SODIUM CHLORIDE, PRESERVATIVE FREE 10 ML: 5 INJECTION INTRAVENOUS at 21:38

## 2023-09-19 RX ADMIN — ACETAMINOPHEN 650 MG: 325 TABLET ORAL at 21:34

## 2023-09-19 RX ADMIN — GABAPENTIN 600 MG: 300 CAPSULE ORAL at 21:27

## 2023-09-19 RX ADMIN — ATORVASTATIN CALCIUM 80 MG: 40 TABLET, FILM COATED ORAL at 21:26

## 2023-09-19 RX ADMIN — GABAPENTIN 600 MG: 300 CAPSULE ORAL at 07:46

## 2023-09-19 RX ADMIN — QUETIAPINE FUMARATE 12.5 MG: 25 TABLET ORAL at 07:46

## 2023-09-19 RX ADMIN — IOPAMIDOL 75 ML: 755 INJECTION, SOLUTION INTRAVENOUS at 22:49

## 2023-09-19 RX ADMIN — SODIUM CHLORIDE, PRESERVATIVE FREE 10 ML: 5 INJECTION INTRAVENOUS at 22:49

## 2023-09-19 RX ADMIN — INSULIN GLARGINE 10 UNITS: 100 INJECTION, SOLUTION SUBCUTANEOUS at 08:48

## 2023-09-19 RX ADMIN — METOPROLOL TARTRATE 12.5 MG: 25 TABLET, FILM COATED ORAL at 07:47

## 2023-09-19 RX ADMIN — TAMSULOSIN HYDROCHLORIDE 0.4 MG: 0.4 CAPSULE ORAL at 07:47

## 2023-09-19 RX ADMIN — QUETIAPINE FUMARATE 12.5 MG: 25 TABLET ORAL at 14:58

## 2023-09-19 RX ADMIN — CYCLOBENZAPRINE 5 MG: 10 TABLET, FILM COATED ORAL at 21:59

## 2023-09-19 RX ADMIN — ALOGLIPTIN 12.5 MG: 12.5 TABLET, FILM COATED ORAL at 07:46

## 2023-09-19 RX ADMIN — METOPROLOL TARTRATE 12.5 MG: 25 TABLET, FILM COATED ORAL at 21:26

## 2023-09-19 RX ADMIN — INSULIN GLARGINE 10 UNITS: 100 INJECTION, SOLUTION SUBCUTANEOUS at 21:55

## 2023-09-19 RX ADMIN — Medication 80 ML: at 22:48

## 2023-09-19 RX ADMIN — PANTOPRAZOLE SODIUM 40 MG: 40 TABLET, DELAYED RELEASE ORAL at 07:47

## 2023-09-19 ASSESSMENT — PAIN SCALES - GENERAL
PAINLEVEL_OUTOF10: 0
PAINLEVEL_OUTOF10: 3
PAINLEVEL_OUTOF10: 0
PAINLEVEL_OUTOF10: 3

## 2023-09-19 ASSESSMENT — PAIN SCALES - WONG BAKER
WONGBAKER_NUMERICALRESPONSE: 0
WONGBAKER_NUMERICALRESPONSE: 0

## 2023-09-19 ASSESSMENT — PAIN DESCRIPTION - LOCATION
LOCATION: NECK
LOCATION: LEG

## 2023-09-19 NOTE — CARE COORDINATION
Writer spoke with patient armani collins regarding transfer to [address] [place] [address]. Patient oriented to person and place. Falling a sleep during conversation does not recall [facility]. Will continue to follow.

## 2023-09-19 NOTE — PROGRESS NOTES
[facility]  Office: [phone]  Celso Cary DO, Lynn Ling DO, Mellissa Rodriguez DO, Greg Cameron MD, Tania Dougherty MD, Francesca Castle MD, Daniel Holt MD,  Andrzej Rai MD, Armando Grant MD, Hazel Cedeno DO, Ammy Yan MD,  Wing Niru MD, Rosalie Marin MD, Janey Mckeon DO, Sharon Martinez MD,  Jennifer Iglesias DO, Razia Marlow MD, Angel Lucia MD, Immanuel King MD, Dipesh Caballero MD,  Wang Richmond MD, Nick Salazar MD, Caty Ascencio MD, Nakul Chase MD, Miguel Angel Kenney DO, Amari Hand MD,  Dirk Zambrano MD, Debbie Estes MD, Abhinav Ramos, CNP,  Simi Pollard, CNP,, Mimi Masters CNP,  Stephanie Denise DNP, Marito Gomez CNP, Dru Rivas CNP, Janna Sims CNP, Gage Aguilar CNP, Loi Hurd, CNP, Joseph Gomez CNP, Raisa Manning, CNS, Josseline Oneil CNP, Laquita Baird, [address] [place] [address]    Progress Note    9/19/2023    11:29 AM    Name:   Severo Knife  MRN:     [id number]     Acct:      [de-identified]   Room:   [address] [place] [address] Day:  12  Admit Date:  9/7/2023 10:46 AM    PCP:   PROVIDER UNKNOWN  Code Status:  Full Code    Subjective:     Patient was seen and examined at bedside this AM. He remains altered but is now oriented to person and time. Plan to continue current management with anticipated transfer to the [place] once mentation returns to baseline. Medications: Allergies:     Allergies   Allergen Reactions    Ciprofloxacin        Current Meds:   Scheduled Meds:    QUEtiapine  12.5 mg Oral Daily    QUEtiapine  25 mg Oral Nightly    QUEtiapine  12.5 mg Oral Daily    insulin glargine  10 Units SubCUTAneous BID    metoprolol tartrate  12.5 mg Oral BID    insulin lispro  0-16 Units SubCUTAneous TID WC    insulin lispro  0-4 Units SubCUTAneous Nightly    sodium chloride  80 mL IntraVENous Once    alogliptin  12.5 mg Oral Daily

## 2023-09-19 NOTE — PLAN OF CARE
Problem: Discharge Planning  Goal: Discharge to home or other facility with appropriate resources  9/19/2023 0737 by Maddie Armendariz RN  Outcome: Progressing  9/19/2023 0142 by Meredith Ernst RN  Outcome: Progressing  Flowsheets (Taken 9/18/2023 2000)  Discharge to home or other facility with appropriate resources: Identify barriers to discharge with patient and caregiver     Problem: Safety - Adult  Goal: Free from fall injury  9/19/2023 0737 by Maddie Armendariz RN  Outcome: Progressing  9/19/2023 0142 by Meredith Ernst RN  Outcome: Progressing     Problem: Skin/Tissue Integrity  Goal: Absence of new skin breakdown  Description: 1. Monitor for areas of redness and/or skin breakdown  2. Assess vascular access sites hourly  3. Every 4-6 hours minimum:  Change oxygen saturation probe site  4. Every 4-6 hours:  If on nasal continuous positive airway pressure, respiratory therapy assess nares and determine need for appliance change or resting period.   9/19/2023 0737 by Maddie Armendariz RN  Outcome: Progressing  9/19/2023 0142 by Meredith Ernst RN  Outcome: Progressing     Problem: Metabolic/Fluid and Electrolytes - Adult  Goal: Electrolytes maintained within normal limits  9/19/2023 0737 by Maddie Armendariz RN  Outcome: Progressing  9/19/2023 0142 by Meredith Ernst RN  Outcome: Progressing  Flowsheets (Taken 9/18/2023 2000)  Electrolytes maintained within normal limits: Monitor labs and assess patient for signs and symptoms of electrolyte imbalances  Goal: Hemodynamic stability and optimal renal function maintained  9/19/2023 0737 by Maddie Armendariz RN  Outcome: Progressing  9/19/2023 0142 by Meredith Ernst RN  Outcome: Progressing  Flowsheets (Taken 9/18/2023 2000)  Hemodynamic stability and optimal renal function maintained: Monitor labs and assess for signs and symptoms of volume excess or deficit     Problem: Chronic Conditions and Co-morbidities  Goal: Patient's chronic conditions and co-morbidity symptoms are monitored and maintained

## 2023-09-19 NOTE — CARE COORDINATION
Dr. Armando Portillo spoke with Dr. Sotero Lee bone biopsy was not complete. Dr Armando Portillo  [phone] plan is to do a lung biopsy on Thursday 9/21 if paitent has not been discharged,  if patient has been discharged the procedure will have to be done outpatient. Pauline Lloyd

## 2023-09-19 NOTE — PLAN OF CARE
Problem: Discharge Planning  Goal: Discharge to home or other facility with appropriate resources  Outcome: Progressing  Flowsheets (Taken 9/18/2023 2000)  Discharge to home or other facility with appropriate resources: Identify barriers to discharge with patient and caregiver     Problem: Safety - Adult  Goal: Free from fall injury  Outcome: Progressing     Problem: Skin/Tissue Integrity  Goal: Absence of new skin breakdown  Description: 1. Monitor for areas of redness and/or skin breakdown  2. Assess vascular access sites hourly  3. Every 4-6 hours minimum:  Change oxygen saturation probe site  4. Every 4-6 hours:  If on nasal continuous positive airway pressure, respiratory therapy assess nares and determine need for appliance change or resting period.   Outcome: Progressing     Problem: Metabolic/Fluid and Electrolytes - Adult  Goal: Electrolytes maintained within normal limits  Outcome: Progressing  Flowsheets (Taken 9/18/2023 2000)  Electrolytes maintained within normal limits: Monitor labs and assess patient for signs and symptoms of electrolyte imbalances  Goal: Hemodynamic stability and optimal renal function maintained  Outcome: Progressing  Flowsheets (Taken 9/18/2023 2000)  Hemodynamic stability and optimal renal function maintained: Monitor labs and assess for signs and symptoms of volume excess or deficit     Problem: Chronic Conditions and Co-morbidities  Goal: Patient's chronic conditions and co-morbidity symptoms are monitored and maintained or improved  Outcome: Progressing  Flowsheets (Taken 9/18/2023 2000)  Care Plan - Patient's Chronic Conditions and Co-Morbidity Symptoms are Monitored and Maintained or Improved: Monitor and assess patient's chronic conditions and comorbid symptoms for stability, deterioration, or improvement     Problem: Pain  Goal: Verbalizes/displays adequate comfort level or baseline comfort level  Outcome: Progressing  Flowsheets (Taken 9/18/2023

## 2023-09-19 NOTE — PROGRESS NOTES
Physical Therapy                                               Physical Therapy Cancel Note      DATE: 2023    NAME: Earnest Link  MRN: [id number]   : 1950      Patient not seen this date for Physical Therapy due to: Other:   RN defer due to confusion. Will continue to pursue PT treatment as able.        Electronically signed by Sammy Sutherland PT on 2023 at 3:49 PM

## 2023-09-19 NOTE — CARE COORDINATION
Writer spoke with [facility] coordinator [phone] at [facility] sending transfer form for completion has bed available in progressive unit prior to be placed in [facility] unit. Dr Mel Huerta notified and given paperwork. Patient will require COVID prior to transfer.

## 2023-09-19 NOTE — PLAN OF CARE
Problem: Nutrition Deficit:  Goal: Optimize nutritional status  9/19/2023 1125 by Cortney Arreaga RD  Flowsheets (Taken 9/19/2023 1119)  Nutrient intake appropriate for improving, restoring, or maintaining nutritional needs:   Assess nutritional status and recommend course of action   Monitor oral intake, labs, and treatment plans   Recommend appropriate diets, oral nutritional supplements, and vitamin/mineral supplements  9/19/2023 0737 by Serge Corley RN  Outcome: Progressing  9/19/2023 0142 by Messi Morales RN  Outcome: Progressing

## 2023-09-19 NOTE — PROGRESS NOTES
Comprehensive Nutrition Assessment    Type and Reason for Visit:  Reassess    Nutrition Recommendations/Plan:   Continue current diet, encourage PO intake  ONS TID  Monitor weight, intake, labs, skin     Malnutrition Assessment:  Malnutrition Status:  No malnutrition (09/19/23 1124)    Context:  Acute Illness     Findings of the 6 clinical characteristics of malnutrition:  Energy Intake:  No significant decrease in energy intake  Weight Loss:  No significant weight loss     Body Fat Loss:  Mild body fat loss     Muscle Mass Loss:  Mild muscle mass loss    Fluid Accumulation:  No significant fluid accumulation     Strength:  Not Performed    Nutrition Assessment:    Pt remains admitted for altered mental staus. Pt has moments of clarity but is easily confused. Writer noted no ONS was initially started with diet advancement, will order ONS. Suspect initial stated weight inaccurate, weight stable during admission. PO intake 51-75%. Per rounds, plan to dc pt to [facility] but pt must been oriented prior to dc. Nutrition Related Findings:    No edema noted. Na 127. All other labs/meds reviewed. Wound Type: Surgical Incision, Pressure Injury       Current Nutrition Intake & Therapies:    Average Meal Intake: 51-75%  Average Supplements Intake: None Ordered  ADULT DIET; Dysphagia - Soft and Bite Sized    Anthropometric Measures:  Height: 5' 7\" (170.2 cm)  Ideal Body Weight (IBW): 148 lbs (67 kg)    Current Body Weight: 145 lb 1 oz (65.8 kg), 98 % IBW.     Current BMI (kg/m2): 22.7  BMI Categories: Underweight (BMI less than 22) age over 72    Estimated Daily Nutrient Needs:  Energy Requirements Based On: Kcal/kg  Weight Used for Energy Requirements: Current  Energy (kcal/day): 3841-7052 kcal/day (26-29 kcal/kg)  Weight Used for Protein Requirements: Current  Protein (g/day):  g/day (1.5-2 g/kg IBW)  Method Used for Fluid Requirements: 1 ml/kcal  Fluid (ml/day): 4129-0598 ml    Nutrition Diagnosis:   Increased nutrient needs related to increase demand for energy/nutrients as evidenced by wounds    Nutrition Interventions:   Food and/or Nutrient Delivery: Continue Current Diet, Start Oral Nutrition Supplement  Nutrition Education/Counseling: No recommendation at this time  Coordination of Nutrition Care: Continue to monitor while inpatient, Interdisciplinary Rounds    Goals:  Previous Goal Met: Progressing toward Goal(s)  Goals: PO intake 50% or greater, prior to discharge    Nutrition Monitoring and Evaluation:   Behavioral-Environmental Outcomes: None Identified  Food/Nutrient Intake Outcomes: Food and Nutrient Intake, Supplement Intake  Physical Signs/Symptoms Outcomes: Biochemical Data, Nutrition Focused Physical Findings, Skin, Other (Comment), Fluid Status or Edema    Discharge Planning:     Too soon to determine     KAITLIN Brown, RDN, LD  Registered [address] [place] [address] [address] [phone]

## 2023-09-19 NOTE — PROGRESS NOTES
..    Palliative Care Progress Note    NAME:  Geraldine Dodson RECORD NUMBER:  [id number]  AGE: 81 y.o. GENDER: male  : 1950  TODAY'S DATE:  2023    Reason for Consult:  goals of care, hospice discussion, and support    Plan        Palliative Interaction: I went to see patient and he is awake lying in bed with cervical collar on. He is groggy, and oriented x3. He seems to be disoriented to circumstances. I spoke to Dr. Meagan Howell regarding his condition and he reports that patient will likely need hospice care. I attempted to discuss patients hospitalized problems with him and that he likely is looking at stage IV cancer but we need Bx and workup to confirm. I discussed how treatment would be palliative if this was confirmed and explained what this means. I discussed goals and patient reports wanting Bx to confirm. We discussed options of palliative care vs hospice care based on his goals. I asked patient if he would want treatment if cancer was confirmed and he reports \"I don't know. \"    I discussed patients code status and explained each level. We discussed CPR and intubation in more detail. Patient reports, \"I will have to think about it. \"    I reviewed Psych and spoke to RN regarding their recommendations. She will follow up with primary. I discussed HCPOA and patient doesn't appear to have one. He reports sister being someone that he use to look to. He was angry and reports that she is now estranged. He is aware that she is Next of Kin and I informed him that I will reach out to her today. I discussed the option to nominate someone different if he would wish. I offered patient support and informed him that we will continue to follow. I reached out to sister Alysia Fields and discussed patients hospitalized problems in details and she had many questions and these were answered. I deferred her to Oncology regarding prognosis with/without treatment.  We discussed palliative care vs

## 2023-09-19 NOTE — PROGRESS NOTES
Occupational [address]  Occupational Therapy Not Seen Note    DATE: 2023    NAME: Vanessa Espino  MRN: [id number]   : 1950      Patient not seen this date for Occupational Therapy due to: Other: RN defer due to confusion.  Will continue to pursue    Next Scheduled Treatment:     Electronically signed by Marbella Dsouza on 2023 at 3:28 PM

## 2023-09-19 NOTE — PROGRESS NOTES
_                         Today's Date: 9/19/2023  Patient Name: Tila Ngo  Date of admission: 9/7/2023 10:46 AM  Patient's age: 81 y. o., 1950  Admission Dx: Altered mental status [R41.82]  Generalized weakness [R53.1]  Elevated troponin [R77.8]      Requesting Physician: Yayo Menjivar MD    CHIEF COMPLAINT: Generalized weakness and fatigue. Failure to thrive. Progressive weakness of both arms. Lung mass. Pulm metastasis. INTERIM HISTORY  The patient is seen and evaluated   Patient is feeling sleepy. Family at bedside. Had many questions about the findings. We cannot find out biopsy of the bone. I ended up calling orthopedics unfortunately the biopsy was not done because of the posterior approach of the surgery. We will not likely to biopsy the mass in the lung. HISTORY OF PRESENT ILLNESS:      The patient is a 81 y.o.  male who is admitted to the hospital for further management of altered mental status with worsening weakness and fatigue. He had multiple other findings including elevated troponin and abnormal imaging. The patient was recently diagnosed with lung mass based on screening CT scan due to history of smoking. He smoked average of 2 to 3 packs/day for more than 50 years. Recently down to about 12 to 15 cigarettes daily. CT scan showed suspicious mass in the lung and he was scheduled for biopsy at the [facility] in [facility]. It was not done yet. Patient had increasing weakness of both arms and hands over the last several months. He was using wheelchair and recently had difficulty operating and using upper extremities. 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 The patient had evaluation with MRI of the cervical spine which showed severe canal stenosis. He was evaluated by orthopedics with plan for possible intervention and surgery. Patient denies any chest pain.   No cough, sputum or hemoptysis. No headaches. No other complaints. Past Medical History:   has a past medical history of CAD (coronary artery disease), Falls, Myocardial infarction ([address]), PVD (peripheral vascular disease) ([address]), and Urinary retention. Past Surgical History:   has a past surgical history that includes Leg amputation below knee (Right); Cardiac surgery; Cervical spine surgery (N/A, 09/14/2023); and cervical fusion (N/A, 9/14/2023). Family History: family history is not on file. Social History:   reports that he has been smoking cigarettes. He does not have any smokeless tobacco history on file. He reports that he does not currently use alcohol. He reports that he does not currently use drugs. Medications:    Prior to Admission medications    Medication Sig Start Date End Date Taking? Authorizing Provider   insulin aspart (NOVOLOG FLEXPEN) 100 UNIT/ML injection pen Inject into the skin 3 times daily (before meals) Sliding scale   Yes Historical Provider, MD   gabapentin (NEURONTIN) 600 MG tablet Take 1 tablet by mouth 2 times daily. Yes Historical Provider, MD   HYDROcodone-acetaminophen (NORCO) 5-325 MG per tablet Take 1 tablet by mouth every 4 hours as needed for Pain.  Max Daily Amount: 6 tablets   Yes Historical Provider, MD   cyclobenzaprine (FLEXERIL) 5 MG tablet Take 1 tablet by mouth 2 times daily as needed for Muscle spasms   Yes Historical Provider, MD   tamsulosin (FLOMAX) 0.4 MG capsule Take 1 capsule by mouth daily   Yes Historical Provider, MD   alogliptin (NESINA) 12.5 MG TABS tablet Take 1 tablet by mouth daily   Yes Historical Provider, MD   folic acid (FOLVITE) 1 MG tablet Take 1 tablet by mouth daily   Yes Historical Provider, MD   aspirin 81 MG EC tablet Take 1 tablet by mouth daily   Yes Historical Provider, MD   nicotine (NICODERM CQ) Place 1 patch onto the skin daily 21mg/24hr   Yes Historical Provider, MD   carvedilol (COREG) 12.5 MG tablet Take 1 tablet by mouth 2

## 2023-09-20 LAB
ANION GAP SERPL CALCULATED.3IONS-SCNC: 10 MMOL/L (ref 9–17)
BASOPHILS # BLD: 0 K/UL (ref 0–0.2)
BASOPHILS NFR BLD: 0 % (ref 0–2)
BUN SERPL-MCNC: 33 MG/DL (ref 8–23)
CALCIUM SERPL-MCNC: 8.9 MG/DL (ref 8.6–10.4)
CHLORIDE SERPL-SCNC: 101 MMOL/L (ref 98–107)
CO2 SERPL-SCNC: 26 MMOL/L (ref 20–31)
CREAT SERPL-MCNC: 0.8 MG/DL (ref 0.7–1.2)
EOSINOPHIL # BLD: 0.2 K/UL (ref 0–0.4)
EOSINOPHILS RELATIVE PERCENT: 2 % (ref 1–4)
ERYTHROCYTE [DISTWIDTH] IN BLOOD BY AUTOMATED COUNT: 14.4 % (ref 12.5–15.4)
GFR SERPL CREATININE-BSD FRML MDRD: >60 ML/MIN/1.73M2
GLUCOSE BLD-MCNC: 149 MG/DL (ref 75–110)
GLUCOSE BLD-MCNC: 182 MG/DL (ref 75–110)
GLUCOSE BLD-MCNC: 203 MG/DL (ref 75–110)
GLUCOSE BLD-MCNC: 81 MG/DL (ref 75–110)
GLUCOSE SERPL-MCNC: 71 MG/DL (ref 70–99)
HCT VFR BLD AUTO: 37.8 % (ref 41–53)
HGB BLD-MCNC: 12.6 G/DL (ref 13.5–17.5)
LYMPHOCYTES NFR BLD: 2.2 K/UL (ref 1–4.8)
LYMPHOCYTES RELATIVE PERCENT: 17 % (ref 24–44)
MCH RBC QN AUTO: 30.6 PG (ref 26–34)
MCHC RBC AUTO-ENTMCNC: 33.4 G/DL (ref 31–37)
MCV RBC AUTO: 91.6 FL (ref 80–100)
MONOCYTES NFR BLD: 1.4 K/UL (ref 0.1–1.2)
MONOCYTES NFR BLD: 11 % (ref 2–11)
NEUTROPHILS NFR BLD: 70 % (ref 36–66)
NEUTS SEG NFR BLD: 9.3 K/UL (ref 1.8–7.7)
PLATELET # BLD AUTO: 302 K/UL (ref 140–450)
PMV BLD AUTO: 7.1 FL (ref 6–12)
POTASSIUM SERPL-SCNC: 4 MMOL/L (ref 3.7–5.3)
RBC # BLD AUTO: 4.13 M/UL (ref 4.5–5.9)
SARS-COV-2 RDRP RESP QL NAA+PROBE: NOT DETECTED
SODIUM SERPL-SCNC: 137 MMOL/L (ref 135–144)
SPECIMEN DESCRIPTION: NORMAL
WBC OTHER # BLD: 13.1 K/UL (ref 3.5–11)

## 2023-09-20 PROCEDURE — 85025 COMPLETE CBC W/AUTO DIFF WBC: CPT

## 2023-09-20 PROCEDURE — 6370000000 HC RX 637 (ALT 250 FOR IP): Performed by: STUDENT IN AN ORGANIZED HEALTH CARE EDUCATION/TRAINING PROGRAM

## 2023-09-20 PROCEDURE — 2700000000 HC OXYGEN THERAPY PER DAY

## 2023-09-20 PROCEDURE — 97530 THERAPEUTIC ACTIVITIES: CPT

## 2023-09-20 PROCEDURE — 97112 NEUROMUSCULAR REEDUCATION: CPT

## 2023-09-20 PROCEDURE — 82947 ASSAY GLUCOSE BLOOD QUANT: CPT

## 2023-09-20 PROCEDURE — 87635 SARS-COV-2 COVID-19 AMP PRB: CPT

## 2023-09-20 PROCEDURE — 6370000000 HC RX 637 (ALT 250 FOR IP): Performed by: HOSPITALIST

## 2023-09-20 PROCEDURE — 94761 N-INVAS EAR/PLS OXIMETRY MLT: CPT

## 2023-09-20 PROCEDURE — 36415 COLL VENOUS BLD VENIPUNCTURE: CPT

## 2023-09-20 PROCEDURE — 80048 BASIC METABOLIC PNL TOTAL CA: CPT

## 2023-09-20 PROCEDURE — 1200000000 HC SEMI PRIVATE

## 2023-09-20 PROCEDURE — 6370000000 HC RX 637 (ALT 250 FOR IP): Performed by: SURGERY

## 2023-09-20 PROCEDURE — 99232 SBSQ HOSP IP/OBS MODERATE 35: CPT | Performed by: STUDENT IN AN ORGANIZED HEALTH CARE EDUCATION/TRAINING PROGRAM

## 2023-09-20 PROCEDURE — 99232 SBSQ HOSP IP/OBS MODERATE 35: CPT | Performed by: INTERNAL MEDICINE

## 2023-09-20 PROCEDURE — 2580000003 HC RX 258: Performed by: STUDENT IN AN ORGANIZED HEALTH CARE EDUCATION/TRAINING PROGRAM

## 2023-09-20 PROCEDURE — 97535 SELF CARE MNGMENT TRAINING: CPT

## 2023-09-20 PROCEDURE — 2580000003 HC RX 258: Performed by: CHIROPRACTOR

## 2023-09-20 PROCEDURE — 6370000000 HC RX 637 (ALT 250 FOR IP): Performed by: CHIROPRACTOR

## 2023-09-20 RX ADMIN — METOPROLOL TARTRATE 12.5 MG: 25 TABLET, FILM COATED ORAL at 20:53

## 2023-09-20 RX ADMIN — METOPROLOL TARTRATE 12.5 MG: 25 TABLET, FILM COATED ORAL at 09:22

## 2023-09-20 RX ADMIN — INSULIN GLARGINE 10 UNITS: 100 INJECTION, SOLUTION SUBCUTANEOUS at 20:54

## 2023-09-20 RX ADMIN — QUETIAPINE FUMARATE 12.5 MG: 25 TABLET ORAL at 09:22

## 2023-09-20 RX ADMIN — HYDROCODONE BITARTRATE AND ACETAMINOPHEN 2 TABLET: 5; 325 TABLET ORAL at 12:12

## 2023-09-20 RX ADMIN — QUETIAPINE FUMARATE 25 MG: 25 TABLET ORAL at 20:53

## 2023-09-20 RX ADMIN — SODIUM CHLORIDE, PRESERVATIVE FREE 10 ML: 5 INJECTION INTRAVENOUS at 09:23

## 2023-09-20 RX ADMIN — ATORVASTATIN CALCIUM 80 MG: 40 TABLET, FILM COATED ORAL at 20:53

## 2023-09-20 RX ADMIN — GABAPENTIN 600 MG: 300 CAPSULE ORAL at 20:52

## 2023-09-20 RX ADMIN — INSULIN GLARGINE 10 UNITS: 100 INJECTION, SOLUTION SUBCUTANEOUS at 09:28

## 2023-09-20 RX ADMIN — SODIUM CHLORIDE, PRESERVATIVE FREE 10 ML: 5 INJECTION INTRAVENOUS at 21:07

## 2023-09-20 RX ADMIN — GABAPENTIN 600 MG: 300 CAPSULE ORAL at 09:22

## 2023-09-20 RX ADMIN — SODIUM CHLORIDE, PRESERVATIVE FREE 10 ML: 5 INJECTION INTRAVENOUS at 21:06

## 2023-09-20 RX ADMIN — QUETIAPINE FUMARATE 12.5 MG: 25 TABLET ORAL at 14:05

## 2023-09-20 RX ADMIN — ALOGLIPTIN 12.5 MG: 12.5 TABLET, FILM COATED ORAL at 09:22

## 2023-09-20 RX ADMIN — PANTOPRAZOLE SODIUM 40 MG: 40 TABLET, DELAYED RELEASE ORAL at 06:05

## 2023-09-20 RX ADMIN — TAMSULOSIN HYDROCHLORIDE 0.4 MG: 0.4 CAPSULE ORAL at 09:28

## 2023-09-20 ASSESSMENT — PAIN SCALES - GENERAL
PAINLEVEL_OUTOF10: 10
PAINLEVEL_OUTOF10: 6
PAINLEVEL_OUTOF10: 6
PAINLEVEL_OUTOF10: 0
PAINLEVEL_OUTOF10: 7
PAINLEVEL_OUTOF10: 5
PAINLEVEL_OUTOF10: 6
PAINLEVEL_OUTOF10: 5
PAINLEVEL_OUTOF10: 5
PAINLEVEL_OUTOF10: 6
PAINLEVEL_OUTOF10: 6
PAINLEVEL_OUTOF10: 7
PAINLEVEL_OUTOF10: 5
PAINLEVEL_OUTOF10: 5
PAINLEVEL_OUTOF10: 0
PAINLEVEL_OUTOF10: 7

## 2023-09-20 ASSESSMENT — PAIN SCALES - WONG BAKER

## 2023-09-20 ASSESSMENT — PAIN DESCRIPTION - ORIENTATION: ORIENTATION: RIGHT;LEFT

## 2023-09-20 ASSESSMENT — PAIN DESCRIPTION - LOCATION: LOCATION: BACK;NECK;SHOULDER

## 2023-09-20 ASSESSMENT — PAIN - FUNCTIONAL ASSESSMENT: PAIN_FUNCTIONAL_ASSESSMENT: ACTIVITIES ARE NOT PREVENTED

## 2023-09-20 ASSESSMENT — PAIN DESCRIPTION - DESCRIPTORS: DESCRIPTORS: ACHING;DISCOMFORT;PRESSURE;SHARP

## 2023-09-20 NOTE — PLAN OF CARE
Problem: Discharge Planning  Goal: Discharge to home or other facility with appropriate resources  9/20/2023 0739 by Jaguar Kang RN  Outcome: Progressing  9/20/2023 0305 by Donna Granda RN  Outcome: Progressing     Problem: Safety - Adult  Goal: Free from fall injury  9/20/2023 0739 by Jaguar Kang RN  Outcome: Progressing  9/20/2023 0305 by Donna Granda RN  Outcome: Progressing     Problem: Skin/Tissue Integrity  Goal: Absence of new skin breakdown  Description: 1. Monitor for areas of redness and/or skin breakdown  2. Assess vascular access sites hourly  3. Every 4-6 hours minimum:  Change oxygen saturation probe site  4. Every 4-6 hours:  If on nasal continuous positive airway pressure, respiratory therapy assess nares and determine need for appliance change or resting period.   9/20/2023 0739 by Jaguar Kang RN  Outcome: Progressing  9/20/2023 0305 by oDnna Granda RN  Outcome: Progressing     Problem: Metabolic/Fluid and Electrolytes - Adult  Goal: Electrolytes maintained within normal limits  9/20/2023 0739 by Jaguar Kang RN  Outcome: Progressing  9/20/2023 0305 by Donna Granda RN  Outcome: Progressing  Goal: Hemodynamic stability and optimal renal function maintained  9/20/2023 0739 by Jaguar Kang RN  Outcome: Progressing  9/20/2023 0305 by Donna Granda RN  Outcome: Progressing     Problem: Chronic Conditions and Co-morbidities  Goal: Patient's chronic conditions and co-morbidity symptoms are monitored and maintained or improved  9/20/2023 0739 by Jaguar Kang RN  Outcome: Progressing  9/20/2023 0305 by Donna Granda RN  Outcome: Progressing     Problem: Pain  Goal: Verbalizes/displays adequate comfort level or baseline comfort level  9/20/2023 0739 by Jaguar Kang RN  Outcome: Progressing  9/20/2023 0305 by Donna Granda RN  Outcome: Progressing     Problem: ABCDS Injury Assessment  Goal: Absence of physical injury  9/20/2023 0739 by Jaguar Kang RN  Outcome: Progressing  9/20/2023 0305 by Donna Granda

## 2023-09-20 NOTE — PROGRESS NOTES
Physical Therapy  Facility/Department: [facility] PROGRESSIVE CARE  Physical Therapy Daily Treatment Note    Name: Yadi Hurley  : 1950  MRN: [id number]  Date of Service: 2023    Discharge Recommendations:  Patient would benefit from continued therapy after discharge          Patient Diagnosis(es): The primary encounter diagnosis was Elevated troponin. Diagnoses of Generalized weakness and Numbness were also pertinent to this visit. Past Medical History:  has a past medical history of CAD (coronary artery disease), Falls, Myocardial infarction ([address]), PVD (peripheral vascular disease) ([address]), and Urinary retention. Past Surgical History:  has a past surgical history that includes Leg amputation below knee (Right); Cardiac surgery; Cervical spine surgery (N/A, 2023); and cervical fusion (N/A, 2023). Assessment   Body Structures, Functions, Activity Limitations Requiring Skilled Therapeutic Intervention: Decreased functional mobility ; Decreased ROM; Decreased strength;Decreased safe awareness;Decreased endurance; Increased pain;Decreased posture    Assessment: Pt required mod of one for rolling in bed for dependent brief change and hygiene, max of one sup to sit and max x 2 sit to supine. Pt sat on side of bed for 28' with at most mod of one with intermittent CGA and performed functional activities. Pt not appropriate to return to independent living. Pt will benefit from continued PT fro strengthening, balance, endurance, stretching and functional mobility training while in the hospital and at discharge. Specific Instructions for Next Treatment: cervical collar for all activity    Requires PT Follow-Up: Yes    Activity Tolerance  Activity Tolerance: Patient limited by pain; Patient limited by fatigue;Patient limited by endurance  Activity Tolerance Comments: Required adjustment of Aspen collar with pt supine prior to mobility     Plan   Physcial Therapy Plan  General Plan: 3-5 times 0-100% Score: 92.36 (09/20/23 1027)  Mobility Inpatient CMS G-Code Modifier : CM (09/20/23 1027)                 Goals  Short Term Goals  Time Frame for Short Term Goals: 14 days  Short Term Goal 1: Progress bed mobility to min A x 1. Short Term Goal 2: Progress standing with [employer] St[employer] to mod A x 1. Short Term Goal 3: Pt to tolerate mild stretching to R residual limb and improve ROM extension by at least 15 deg. Short Term Goal 4: Pt to have at least 4-/5 to 4/5 BLE muscle strength. Short Term Goal 5: Pt to propel w/c x 50 ft, SBA.   Patient Goals   Patient Goals : to feel better       Education  Patient Education  Education Given To: Patient  Education Provided: Transfer Training  Education Provided Comments: importance of residual LE knee ROM  Education Method: Verbal;Demonstration  Barriers to Learning: Cognition  Education Outcome: Verbalized understanding;Continued education needed;Demonstrated understanding      Therapy Time   Individual Concurrent Group Co-treatment   Time In 0831         Time Out 0935         Minutes 64         Timed Code Treatment Minutes: COLIN French

## 2023-09-20 NOTE — PROGRESS NOTES
Occupational Therapy  Facility/Department: [facility] PROGRESSIVE CARE  Occupational Therapy Daily Treatment Note    Name: Severo Knife  : 1950  MRN: [id number]  Date of Service: 2023    Discharge Recommendations:  Patient would benefit from continued therapy after discharge  OT Equipment Recommendations  Other: continue to assess       Patient Diagnosis(es): The primary encounter diagnosis was Elevated troponin. Diagnoses of Generalized weakness and Numbness were also pertinent to this visit. Past Medical History:  has a past medical history of CAD (coronary artery disease), Falls, Myocardial infarction ([address]), PVD (peripheral vascular disease) ([address]), and Urinary retention. Past Surgical History:  has a past surgical history that includes Leg amputation below knee (Right); Cardiac surgery; Cervical spine surgery (N/A, 2023); and cervical fusion (N/A, 2023). Assessment   Performance deficits / Impairments: Decreased functional mobility ; Decreased safe awareness;Decreased balance;Decreased coordination;Decreased ROM; Decreased endurance;Decreased ADL status; Decreased strength  Assessment: Pt progressing slowly towards STGs. Pt continues to require MAX 1-2 for bed mob, MAX to dep for ADL tasks. Pt would benefit from continued therapy services during hospitalization and following discharge to address functional deficits listed above. Prognosis: Fair  REQUIRES OT FOLLOW-UP: Yes  Activity Tolerance  Activity Tolerance: Patient limited by pain; Patient limited by fatigue  Activity Tolerance Comments: Pt sat EOBx35 min with CGA to MOD A for balance while eating        Plan   Occupational Therapy Plan  Times Per Week: 5-6x/wk  Times Per Day:  Once a day  Current Treatment Recommendations: Strengthening, ROM, Balance training, Functional mobility training, Endurance training, Neuromuscular re-education, Safety education & training, Patient/Caregiver education & training, Equipment evaluation, education, & procurement, Self-Care / ADL     Restrictions  Restrictions/Precautions  Restrictions/Precautions: Fall Risk, Bed Alarm  Required Braces or Orthoses?: No  Position Activity Restriction  Other position/activity restrictions: up with assistance; R BKA (knee flexion contracture); weak chiara hand , cervical lami and decompression 9/14, Aspen cervical collar    Subjective   General  Patient assessed for rehabilitation services?: Yes  Response to previous treatment: Patient with no complaints from previous session  Family / Caregiver Present: No  Subjective  Subjective: Pt reports 8/10 neck pain, 2/10 sacral pain in sitting. General Comment  Comments: Patient cooperative with therapy participation. Objective              Safety Devices  Type of Devices: Bed alarm in place; Left in bed;Patient at risk for falls;Nurse notified; All fall risk precautions in place;Call light within reach  Restraints  Restraints Initially in Place: No  Balance  Sitting: Impaired (static/dynamic-CGA-MOD, likes to lean back and to the righta nd feels he is centered for better balance)        ADL  Feeding: Increased time to complete;Dependent/Total;Maximum assistance;Bringing food to mouth assist;Adaptive utensils (comment); Scoop assist  Feeding Skilled Clinical Factors: pt able to grasp built up utensil in R hand however unable to lift to load or bring to mouth  Grooming: Maximum assistance  Grooming Skilled Clinical Factors: to wipe mouth  LE Dressing: Dependent/Total  LE Dressing Skilled Clinical Factors: slipper sock  Toileting: Dependent/Total  Toileting Skilled Clinical Factors: use of urinal, brief management, hygiene        Bed mobility  Rolling to Left: Moderate assistance  Rolling to Right: Moderate assistance  Supine to Sit: Maximum assistance  Sit to Supine: Maximum assistance;2 Person assistance  Scooting: Dependent/Total  Bed Mobility Comments: rolling for hygiene/brief change due to incontinence

## 2023-09-20 NOTE — PLAN OF CARE
Cath Lab/ IR    RN communicated with Dr. Ellen Browne about IR consult that was placed 9/19. Unable to be done at this facility due to CT not being able to accommodate.

## 2023-09-20 NOTE — PROGRESS NOTES
_                         Today's Date: 9/20/2023  Patient Name: Yuridia Carmona  Date of admission: 9/7/2023 10:46 AM  Patient's age: 81 y. o., 1950  Admission Dx: Altered mental status [R41.82]  Generalized weakness [R53.1]  Elevated troponin [R77.8]      Requesting Physician: Sil Ochoa MD    CHIEF COMPLAINT: Generalized weakness and fatigue. Failure to thrive. Progressive weakness of both arms. Lung mass. Pulm metastasis. INTERIM HISTORY  The patient is seen and evaluated   Mentation improved  Patient and neck collar. Pain manageable  Patient requesting to be transferred to the [facility]      HISTORY OF PRESENT ILLNESS:      The patient is a 81 y.o.  male who is admitted to the hospital for further management of altered mental status with worsening weakness and fatigue. He had multiple other findings including elevated troponin and abnormal imaging. The patient was recently diagnosed with lung mass based on screening CT scan due to history of smoking. He smoked average of 2 to 3 packs/day for more than 50 years. Recently down to about 12 to 15 cigarettes daily. CT scan showed suspicious mass in the lung and he was scheduled for biopsy at the [facility] in [facility]. It was not done yet. Patient had increasing weakness of both arms and hands over the last several months. He was using wheelchair and recently had difficulty operating and using upper extremities. 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 The patient had evaluation with MRI of the cervical spine which showed severe canal stenosis. He was evaluated by orthopedics with plan for possible intervention and surgery. Patient denies any chest pain. No cough, sputum or hemoptysis. No headaches. No other complaints.     Past Medical History:   has a past medical history of CAD (coronary artery disease), Falls, Myocardial infarction

## 2023-09-21 LAB
ANION GAP SERPL CALCULATED.3IONS-SCNC: 8 MMOL/L (ref 9–17)
BACTERIA URNS QL MICRO: ABNORMAL
BASOPHILS # BLD: 0.1 K/UL (ref 0–0.2)
BASOPHILS NFR BLD: 1 % (ref 0–2)
BILIRUB UR QL STRIP: NEGATIVE
BUN SERPL-MCNC: 38 MG/DL (ref 8–23)
CALCIUM SERPL-MCNC: 8.8 MG/DL (ref 8.6–10.4)
CHARACTER UR: ABNORMAL
CHLORIDE SERPL-SCNC: 102 MMOL/L (ref 98–107)
CLARITY UR: ABNORMAL
CO2 SERPL-SCNC: 26 MMOL/L (ref 20–31)
COLOR UR: YELLOW
CREAT SERPL-MCNC: 0.8 MG/DL (ref 0.7–1.2)
CRYSTALS URNS MICRO: ABNORMAL /HPF
EOSINOPHIL # BLD: 0.2 K/UL (ref 0–0.4)
EOSINOPHILS RELATIVE PERCENT: 1 % (ref 1–4)
EPI CELLS #/AREA URNS HPF: ABNORMAL /HPF (ref 0–5)
ERYTHROCYTE [DISTWIDTH] IN BLOOD BY AUTOMATED COUNT: 14.1 % (ref 12.5–15.4)
GFR SERPL CREATININE-BSD FRML MDRD: >60 ML/MIN/1.73M2
GLUCOSE BLD-MCNC: 133 MG/DL (ref 75–110)
GLUCOSE BLD-MCNC: 175 MG/DL (ref 75–110)
GLUCOSE BLD-MCNC: 243 MG/DL (ref 75–110)
GLUCOSE BLD-MCNC: 74 MG/DL (ref 75–110)
GLUCOSE SERPL-MCNC: 131 MG/DL (ref 70–99)
GLUCOSE UR STRIP-MCNC: NEGATIVE MG/DL
HCT VFR BLD AUTO: 37 % (ref 41–53)
HGB BLD-MCNC: 12.5 G/DL (ref 13.5–17.5)
HGB UR QL STRIP.AUTO: NEGATIVE
KETONES UR STRIP-MCNC: NEGATIVE MG/DL
LEUKOCYTE ESTERASE UR QL STRIP: ABNORMAL
LYMPHOCYTES NFR BLD: 1.9 K/UL (ref 1–4.8)
LYMPHOCYTES RELATIVE PERCENT: 13 % (ref 24–44)
MCH RBC QN AUTO: 30.5 PG (ref 26–34)
MCHC RBC AUTO-ENTMCNC: 33.7 G/DL (ref 31–37)
MCV RBC AUTO: 90.3 FL (ref 80–100)
MONOCYTES NFR BLD: 1.4 K/UL (ref 0.1–1.2)
MONOCYTES NFR BLD: 10 % (ref 2–11)
NEUTROPHILS NFR BLD: 75 % (ref 36–66)
NEUTS SEG NFR BLD: 10.8 K/UL (ref 1.8–7.7)
NITRITE UR QL STRIP: POSITIVE
PH UR STRIP: >=9 [PH] (ref 5–8)
PLATELET # BLD AUTO: 272 K/UL (ref 140–450)
PMV BLD AUTO: 7 FL (ref 6–12)
POTASSIUM SERPL-SCNC: 4.2 MMOL/L (ref 3.7–5.3)
PROT UR STRIP-MCNC: ABNORMAL MG/DL
RBC # BLD AUTO: 4.09 M/UL (ref 4.5–5.9)
RBC #/AREA URNS HPF: ABNORMAL /HPF (ref 0–2)
SODIUM SERPL-SCNC: 136 MMOL/L (ref 135–144)
SP GR UR STRIP: 1.02 (ref 1–1.03)
UROBILINOGEN UR STRIP-ACNC: NORMAL EU/DL (ref 0–1)
WBC #/AREA URNS HPF: ABNORMAL /HPF (ref 0–5)
WBC OTHER # BLD: 14.3 K/UL (ref 3.5–11)

## 2023-09-21 PROCEDURE — 97535 SELF CARE MNGMENT TRAINING: CPT

## 2023-09-21 PROCEDURE — 99232 SBSQ HOSP IP/OBS MODERATE 35: CPT | Performed by: STUDENT IN AN ORGANIZED HEALTH CARE EDUCATION/TRAINING PROGRAM

## 2023-09-21 PROCEDURE — 6370000000 HC RX 637 (ALT 250 FOR IP): Performed by: CHIROPRACTOR

## 2023-09-21 PROCEDURE — 85025 COMPLETE CBC W/AUTO DIFF WBC: CPT

## 2023-09-21 PROCEDURE — 99231 SBSQ HOSP IP/OBS SF/LOW 25: CPT | Performed by: INTERNAL MEDICINE

## 2023-09-21 PROCEDURE — 87635 SARS-COV-2 COVID-19 AMP PRB: CPT

## 2023-09-21 PROCEDURE — 6370000000 HC RX 637 (ALT 250 FOR IP): Performed by: STUDENT IN AN ORGANIZED HEALTH CARE EDUCATION/TRAINING PROGRAM

## 2023-09-21 PROCEDURE — 1200000000 HC SEMI PRIVATE

## 2023-09-21 PROCEDURE — 97530 THERAPEUTIC ACTIVITIES: CPT

## 2023-09-21 PROCEDURE — 6370000000 HC RX 637 (ALT 250 FOR IP): Performed by: HOSPITALIST

## 2023-09-21 PROCEDURE — 97116 GAIT TRAINING THERAPY: CPT

## 2023-09-21 PROCEDURE — 36415 COLL VENOUS BLD VENIPUNCTURE: CPT

## 2023-09-21 PROCEDURE — 80048 BASIC METABOLIC PNL TOTAL CA: CPT

## 2023-09-21 PROCEDURE — 2580000003 HC RX 258: Performed by: CHIROPRACTOR

## 2023-09-21 PROCEDURE — 82947 ASSAY GLUCOSE BLOOD QUANT: CPT

## 2023-09-21 PROCEDURE — 6370000000 HC RX 637 (ALT 250 FOR IP): Performed by: SURGERY

## 2023-09-21 PROCEDURE — 2580000003 HC RX 258: Performed by: STUDENT IN AN ORGANIZED HEALTH CARE EDUCATION/TRAINING PROGRAM

## 2023-09-21 PROCEDURE — 81001 URINALYSIS AUTO W/SCOPE: CPT

## 2023-09-21 RX ORDER — QUETIAPINE FUMARATE 25 MG/1
12.5 TABLET, FILM COATED ORAL 2 TIMES DAILY
Qty: 60 TABLET | Refills: 3 | DISCHARGE
Start: 2023-09-21

## 2023-09-21 RX ORDER — QUETIAPINE FUMARATE 25 MG/1
25 TABLET, FILM COATED ORAL NIGHTLY
Qty: 60 TABLET | Refills: 3 | DISCHARGE
Start: 2023-09-21

## 2023-09-21 RX ADMIN — QUETIAPINE FUMARATE 12.5 MG: 25 TABLET ORAL at 15:22

## 2023-09-21 RX ADMIN — METOPROLOL TARTRATE 12.5 MG: 25 TABLET, FILM COATED ORAL at 21:39

## 2023-09-21 RX ADMIN — TAMSULOSIN HYDROCHLORIDE 0.4 MG: 0.4 CAPSULE ORAL at 09:15

## 2023-09-21 RX ADMIN — SODIUM CHLORIDE, PRESERVATIVE FREE 10 ML: 5 INJECTION INTRAVENOUS at 09:15

## 2023-09-21 RX ADMIN — SODIUM CHLORIDE, PRESERVATIVE FREE 10 ML: 5 INJECTION INTRAVENOUS at 21:40

## 2023-09-21 RX ADMIN — GABAPENTIN 600 MG: 300 CAPSULE ORAL at 09:15

## 2023-09-21 RX ADMIN — QUETIAPINE FUMARATE 25 MG: 25 TABLET ORAL at 21:39

## 2023-09-21 RX ADMIN — METOPROLOL TARTRATE 12.5 MG: 25 TABLET, FILM COATED ORAL at 09:15

## 2023-09-21 RX ADMIN — CYCLOBENZAPRINE 5 MG: 10 TABLET, FILM COATED ORAL at 21:43

## 2023-09-21 RX ADMIN — INSULIN GLARGINE 10 UNITS: 100 INJECTION, SOLUTION SUBCUTANEOUS at 21:39

## 2023-09-21 RX ADMIN — QUETIAPINE FUMARATE 12.5 MG: 25 TABLET ORAL at 09:15

## 2023-09-21 RX ADMIN — ATORVASTATIN CALCIUM 80 MG: 40 TABLET, FILM COATED ORAL at 21:39

## 2023-09-21 RX ADMIN — GABAPENTIN 600 MG: 300 CAPSULE ORAL at 21:40

## 2023-09-21 NOTE — PLAN OF CARE
Problem: Discharge Planning  Goal: Discharge to home or other facility with appropriate resources  9/21/2023 1511 by Tim Simmons RN  Outcome: Progressing  Flowsheets (Taken 9/21/2023 0915)  Discharge to home or other facility with appropriate resources: Identify barriers to discharge with patient and caregiver   Pt to discharge to facility once medically cleared. Problem: Safety - Adult  Goal: Free from fall injury  9/21/2023 1511 by Tim Simmons RN  Outcome: Progressing  Flowsheets (Taken 9/21/2023 1136)  Free From Fall Injury: Instruct family/caregiver on patient safety   No injury noted this shift. Problem: Skin/Tissue Integrity  Goal: Absence of new skin breakdown  Description: 1. Monitor for areas of redness and/or skin breakdown  2. Assess vascular access sites hourly  3. Every 4-6 hours minimum:  Change oxygen saturation probe site  4. Every 4-6 hours:  If on nasal continuous positive airway pressure, respiratory therapy assess nares and determine need for appliance change or resting period. 9/21/2023 1511 by Tim Simmons RN  Outcome: Progressing   Monitoring. Problem: Metabolic/Fluid and Electrolytes - Adult  Goal: Electrolytes maintained within normal limits  9/21/2023 1511 by Tim Simmons RN  Outcome: Progressing  Flowsheets (Taken 9/21/2023 0915)  Electrolytes maintained within normal limits: Monitor labs and assess patient for signs and symptoms of electrolyte imbalances   Labs wnl, monitoring. Problem: Metabolic/Fluid and Electrolytes - Adult  Goal: Hemodynamic stability and optimal renal function maintained  9/21/2023 1511 by Tim Simmons RN  Outcome: Progressing  Flowsheets (Taken 9/21/2023 0915)  Hemodynamic stability and optimal renal function maintained: Monitor labs and assess for signs and symptoms of volume excess or deficit   Monitoring.    Problem: Chronic Conditions and Co-morbidities  Goal: Patient's chronic conditions and co-morbidity symptoms are monitored and maintained or improved  9/21/2023 1511 by Shadi Kruger RN  Outcome: Progressing  Flowsheets (Taken 9/21/2023 0915)  Care Plan - Patient's Chronic Conditions and Co-Morbidity Symptoms are Monitored and Maintained or Improved: Monitor and assess patient's chronic conditions and comorbid symptoms for stability, deterioration, or improvement   Monitoring. Problem: Pain  Goal: Verbalizes/displays adequate comfort level or baseline comfort level  9/21/2023 1511 by Shadi Kruger RN  Outcome: Progressing  Flowsheets (Taken 9/21/2023 0803)  Verbalizes/displays adequate comfort level or baseline comfort level: Encourage patient to monitor pain and request assistance   Pt repositioned for comfort. Problem: ABCDS Injury Assessment  Goal: Absence of physical injury  9/21/2023 1511 by Shadi Kruger RN  Outcome: Progressing  Flowsheets (Taken 9/21/2023 1136)  Absence of Physical Injury: Implement safety measures based on patient assessment   No injury noted this shift. Problem: Nutrition Deficit:  Goal: Optimize nutritional status  9/21/2023 1511 by Shadi Kruger RN  Outcome: Progressing   Pt tolerating po intake. Assistance needed.

## 2023-09-21 NOTE — PROGRESS NOTES
_                         Today's Date: 9/21/2023  Patient Name: Gretchen Escudero  Date of admission: 9/7/2023 10:46 AM  Patient's age: 81 y. o., 1950  Admission Dx: Altered mental status [R41.82]  Generalized weakness [R53.1]  Elevated troponin [R77.8]      Requesting Physician: Susan Rosales MD    CHIEF COMPLAINT: Generalized weakness and fatigue. Failure to thrive. Progressive weakness of both arms. Lung mass. Pulm metastasis. INTERIM HISTORY  The patient is seen and evaluated   Mentation improved  Biopsy could not be done and the plan is to do it as an outpatient. Plan to discharge today or transition to the [place]. HISTORY OF PRESENT ILLNESS:      The patient is a 81 y.o.  male who is admitted to the hospital for further management of altered mental status with worsening weakness and fatigue. He had multiple other findings including elevated troponin and abnormal imaging. The patient was recently diagnosed with lung mass based on screening CT scan due to history of smoking. He smoked average of 2 to 3 packs/day for more than 50 years. Recently down to about 12 to 15 cigarettes daily. CT scan showed suspicious mass in the lung and he was scheduled for biopsy at the [facility] in [facility]. It was not done yet. Patient had increasing weakness of both arms and hands over the last several months. He was using wheelchair and recently had difficulty operating and using upper extremities. 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 The patient had evaluation with MRI of the cervical spine which showed severe canal stenosis. He was evaluated by orthopedics with plan for possible intervention and surgery. Patient denies any chest pain. No cough, sputum or hemoptysis. No headaches. No other complaints.     Past Medical History:   has a past medical history of CAD (coronary artery disease), Falls, Units SubCUTAneous Nightly Hayley Crumb Brittanytz, DO        sodium chloride 0.9 % bolus 80 mL  80 mL IntraVENous Once Reserve Lauth, DO        alogliptin (NESINA) tablet 12.5 mg  12.5 mg Oral Daily [facility], DO   12.5 mg at 09/20/23 6308    atorvastatin (LIPITOR) tablet 80 mg  80 mg Oral Daily Tay[facility], DO   80 mg at 09/20/23 2053    pantoprazole (PROTONIX) tablet 40 mg  40 mg Oral QAM AC Almas Devlinrest, DO   40 mg at 09/20/23 7212    tamsulosin (FLOMAX) capsule 0.4 mg  0.4 mg Oral Daily Tayo Lauth, DO   0.4 mg at 09/21/23 0915    sodium chloride flush 0.9 % injection 5-40 mL  5-40 mL IntraVENous 2 times per day [facility], DO   10 mL at 09/21/23 0915    sodium chloride flush 0.9 % injection 5-40 mL  5-40 mL IntraVENous PRN [facility], DO        0.9 % sodium chloride infusion   IntraVENous PRN [facility], DO        ondansetron (ZOFRAN-ODT) disintegrating tablet 4 mg  4 mg Oral Q8H PRN [facility], DO        Or    [facility]etron [facility] injection 4 mg  4 mg IntraVENous Q6H PRN [facility], DO        polyethylene glycol (GLYCOLAX) packet 17 g  17 g Oral Daily PRN [facility], DO        acetaminophen (TYLENOL) tablet 650 mg  650 mg Oral Q6H PRN [facility], DO   650 mg at 09/19/23 2134    Or    acetaminophen (TYLENOL) suppository 650 mg  650 mg Rectal Q6H PRN Tayo Lauth, DO        gabapentin (NEURONTIN) capsule 600 mg  600 mg Oral BID Reserve Lauth, DO   600 mg at 09/21/23 0915    glucose chewable tablet 16 g  4 tablet Oral PRN Reserve Lauth, DO        dextrose bolus 10% 125 mL  125 mL IntraVENous PRN Tayo Lauth, DO        Or    dextrose bolus 10% 250 mL  250 mL IntraVENous PRN Reserve Lauth, DO        glucagon injection 1 mg  1 mg SubCUTAneous PRN Reserve Lauth, DO        dextrose 10 % infusion   IntraVENous Continuous PRN [facility], DO           Allergies:  Ciprofloxacin    REVIEW OF SYSTEMS:      General: Positive for weakness and fatigue.  No

## 2023-09-21 NOTE — PROGRESS NOTES
[facility]  Office: [phone]  Maryclaribel Meza DO, Stef Hanna DO, Sangeeta Quintana Jennifer, DO, Raina Kwong MD, Mona Thomas MD, An Jackson MD, Maureen House MD,  Roman Vigil MD, Shilpa Connor MD, Jorge Ramos DO, Mitzy Goldstein MD,  Thao Hardy MD, Estefania Garrison MD, Duyen Snowden DO, Ramon Baez MD,  Thom Martinez MD, Meño De Leon MD, Chema Hassan MD, Cheryle Spillers, MD,  Nestor Hill MD, Sang Chang MD, Sharda Bravo MD, Gloria Kaur MD, Helene Gambino DO, Ulisses Rhodes MD,  Ricardo Stiles MD, Pascual Nunez MD, Dayna Hdz, CNP,  Reji Stephens, CNP,, Manuela Burnham, CNP,  Heather Chandler, [facility], Lera Sandhoff, CNP, Jacques Suarez, CNP, Dharmesh Castellon, CNP, Sonal Jaimes, CNP, Alia Jacome, CNP, Zeke Coulter, CNP, Manuel Artis, CNS, Dior Fong, CNP, Salvatore Dumont, [address] [place] [address]    Progress Note    9/21/2023    9:59 AM    Name:   Laura Garcia  MRN:     [id number]     Acct:      [de-identified]   Room:   [address] [place] [address] Day:  15  Admit Date:  9/7/2023 10:46 AM    PCP:   PROVIDER UNKNOWN  Code Status:  Full Code    Subjective:     Patient was seen and examined at bedside this AM. He reports feeling well and is without complaint. Plan for lung biopsy today. Awaiting transfer to the [place]. Medications: Allergies:     Allergies   Allergen Reactions    Ciprofloxacin        Current Meds:   Scheduled Meds:    QUEtiapine  12.5 mg Oral Daily    QUEtiapine  25 mg Oral Nightly    QUEtiapine  12.5 mg Oral Daily    sodium chloride  80 mL IntraVENous Once    sodium chloride flush  10 mL IntraVENous BID    insulin glargine  10 Units SubCUTAneous BID    metoprolol tartrate  12.5 mg Oral BID    insulin lispro  0-16 Units SubCUTAneous TID WC    insulin lispro  0-4 Units SubCUTAneous Nightly    sodium chloride  80 mL IntraVENous Once    alogliptin

## 2023-09-21 NOTE — CARE COORDINATION
Writer spoke with Patric Seals [phone] @ [facility], patient is accept to [facility]. Transport by [facility] by stretcher @ 830am to arrive at [address] [place] [address] by 10:00am.     COVID PCR test to be faxed to [phone] to the attention of Patric Seals. If patient is COVID + he will go to hospital @ [address] [place] [address]. RN to call report to   [phone] approximate base line cost of transport by stretcher 50.00 plus and per mile charge of $5-7, patient notified and is agreeable to transport.

## 2023-09-21 NOTE — PROGRESS NOTES
Sitting balance requiring at most mod assist of one (with intermittent CGA). Co- treatment with PT warranted secondary to decreased patient safety and independence with functional mobility requiring skilled physical assistance of two professionals to simultaneously address individualized discipline goals. OT is addressing functional sitting tasks of eating and ADLs, functional endurance, UE strength , while PT is addressing their individualized functional mobility task. Cognition  Overall Cognitive Status: WFL  Orientation  Overall Orientation Status: Within Functional Limits                  Education Given To: Patient  Education Provided: Energy Conservation; ADL Adaptive Strategies;Transfer Training  Education Method: Verbal;Demonstration  Barriers to Learning: None  Education Outcome: Continued education needed;Verbalized understanding                   AM-PAC Score        AM-PAC Inpatient Daily Activity Raw Score: 8 (09/21/23 1606)  AM-PAC Inpatient ADL T-Scale Score : 22.86 (09/21/23 1606)  ADL Inpatient CMS 0-100% Score: 85.69 (09/21/23 1606)  ADL Inpatient CMS G-Code Modifier : CM (09/21/23 1606)      Goals  Short Term Goals  Time Frame for Short Term Goals: 14 days pt will  Short Term Goal 1: ADL sitting balance to F+  Short Term Goal 2: Grooming supported sit with min assist and least restrictive AE/AD  Short Term Goal 3: Complete self feed with min assist after setup  Short Term Goal 4: UB strength to 3+/5 to assist with bed mobility to min assist for rolling and sup to sit for pressure relief and self care  Short Term Goal 5: ADL tolerance to 10 minutes for safe completion of tasks  Additional Goals?: No  Patient Goals   Patient goals : go to rehab       Therapy Time   Individual Concurrent Group Co-treatment   Time In 1512         Time Out 1549         Minutes 37         Timed Code Treatment Minutes: [address], OTR/L

## 2023-09-21 NOTE — PROGRESS NOTES
Physical Therapy  Facility/Department: [facility] PROGRESSIVE CARE  Physical Therapy Daily Progress Note    Name: Lluvia Mercado  : 1950  MRN: [id number]  Date of Service: 2023    Discharge Recommendations:  Patient would benefit from continued therapy after discharge   PT Equipment Recommendations  Other: continued assessment of equipment with continued therapy      Patient Diagnosis(es): The primary encounter diagnosis was Elevated troponin. Diagnoses of Generalized weakness, Numbness, Cancer, metastatic to bone [facility]), and Lung mass were also pertinent to this visit. Past Medical History:  has a past medical history of CAD (coronary artery disease), Falls, Myocardial infarction ([address]), PVD (peripheral vascular disease) ([address]), and Urinary retention. Past Surgical History:  has a past surgical history that includes Leg amputation below knee (Right); Cardiac surgery; Cervical spine surgery (N/A, 2023); and cervical fusion (N/A, 2023). Assessment   Body Structures, Functions, Activity Limitations Requiring Skilled Therapeutic Intervention: Decreased functional mobility ; Decreased ROM; Decreased strength;Decreased safe awareness;Decreased endurance; Increased pain;Decreased posture  Assessment: Pt presents with decreased functional mobility. Pt required mod of one for rolling in bed. Pt required max A x 2 for sup to sit and max A x 2 sit to supine. Pt sat EOB for ~30 min with at most mod of one with intermittent CGA and performed functional activities. PT not appropriate to return to independent living. Pt will benefit from continued PT for strengthening, balance, endurance, stretching and functional mobility training while in the hospital and at discharge.   Specific Instructions for Next Treatment: cervical collar for all activity  Therapy Prognosis: Fair  Decision Making: Medium Complexity  Requires PT Follow-Up: Yes  Activity Tolerance  Activity Tolerance: Patient limited by

## 2023-09-21 NOTE — PROGRESS NOTES
[facility] Wound Ostomy  Nurse  Follow up Note       NAME:  Geraldine Dodson RECORD NUMBER:  [id number]  AGE: 81 y.o. GENDER: male  : 1950  TODAY'S DATE:  2023    Right ear and right neck pressure injuries have resolved. Chin wound with epithelial tissue, no drainage; resolved. Patient reports it feels much better. Plan   Plan of Care:   Continue current plan. Continue routine collar care and hygiene; extra collar pads were provided for staff to change; may wash old pads in sinks and air dry    COCCYGEAL WOUND:  Sacral foam dressing to sacrococcygeal area. Place small piece of Maxorb alginate over open wound. Change every 2 days and as needed if loose or soiled. Coccyx dressing intact, next due . Patient reports decreased pain with air mattress in place. [x]Turn and reposition every 2 hours while in bed. [x] Float left heel off of bed with pillows under calves. [] Heel protective boots (heel medix boots) at all times while in bed. [] Apply zinc oxide cream twice daily and as needed after incontinent episodes. [x] Perform routine incontinence care with use of foam cleanser. [x] Use single layer moisture wicking underpad. [x] Use comfort glide system and wedges to reposition patient. [] Keep the head of the bed below 30 degrees unless contraindicated. [] Pressure reducing chair cushion while up to chair. Reposition every hour while in chair and limit chair time to 2 hour intervals. [x] Encourage good nutritional intake and fluids. Consult dietician if needed. Specialty Bed Required : Yes   [] Low Air Loss   [x] Pressure Redistribution  static air overlay in place  [] Fluid Immersion  [] Bariatric  [] Total Pressure Relief  [] Other:     Current Diet: ADULT DIET;  Regular      Discharge Plan:  Placement for patient upon discharge: skilled nursing    Patient appropriate for Outpatient [address]: Brenna Orellana RN BSN, CWON

## 2023-09-21 NOTE — CARE COORDINATION
Writer spoke with Samson @ [facility] advised that patient has a wheelchair that will need to be transported with him as well as his belonging , she noted this in transport request.     Rosemary Mccollum requested a copy of diagnostic CD for all imaging completed since 9/7/2023 spoke with Clarita Myers in xray/ct, she will have nite time staff complete and call RN when completed. [address] annie notified.

## 2023-09-22 VITALS
SYSTOLIC BLOOD PRESSURE: 111 MMHG | WEIGHT: 151.9 LBS | TEMPERATURE: 97.7 F | OXYGEN SATURATION: 96 % | BODY MASS INDEX: 23.84 KG/M2 | HEIGHT: 67 IN | HEART RATE: 78 BPM | RESPIRATION RATE: 16 BRPM | DIASTOLIC BLOOD PRESSURE: 67 MMHG

## 2023-09-22 LAB
ANION GAP SERPL CALCULATED.3IONS-SCNC: 8 MMOL/L (ref 9–17)
BASOPHILS # BLD: 0.1 K/UL (ref 0–0.2)
BASOPHILS NFR BLD: 1 % (ref 0–2)
BUN SERPL-MCNC: 31 MG/DL (ref 8–23)
CALCIUM SERPL-MCNC: 9.1 MG/DL (ref 8.6–10.4)
CHLORIDE SERPL-SCNC: 102 MMOL/L (ref 98–107)
CO2 SERPL-SCNC: 28 MMOL/L (ref 20–31)
CREAT SERPL-MCNC: 0.7 MG/DL (ref 0.7–1.2)
EOSINOPHIL # BLD: 0.2 K/UL (ref 0–0.4)
EOSINOPHILS RELATIVE PERCENT: 2 % (ref 1–4)
ERYTHROCYTE [DISTWIDTH] IN BLOOD BY AUTOMATED COUNT: 14 % (ref 12.5–15.4)
GFR SERPL CREATININE-BSD FRML MDRD: >60 ML/MIN/1.73M2
GLUCOSE BLD-MCNC: 130 MG/DL (ref 75–110)
GLUCOSE SERPL-MCNC: 123 MG/DL (ref 70–99)
HCT VFR BLD AUTO: 39.2 % (ref 41–53)
HGB BLD-MCNC: 13.2 G/DL (ref 13.5–17.5)
LYMPHOCYTES NFR BLD: 2.3 K/UL (ref 1–4.8)
LYMPHOCYTES RELATIVE PERCENT: 23 % (ref 24–44)
MCH RBC QN AUTO: 30.5 PG (ref 26–34)
MCHC RBC AUTO-ENTMCNC: 33.7 G/DL (ref 31–37)
MCV RBC AUTO: 90.7 FL (ref 80–100)
MONOCYTES NFR BLD: 1.1 K/UL (ref 0.1–1.2)
MONOCYTES NFR BLD: 10 % (ref 2–11)
NEUTROPHILS NFR BLD: 64 % (ref 36–66)
NEUTS SEG NFR BLD: 6.6 K/UL (ref 1.8–7.7)
PLATELET # BLD AUTO: 269 K/UL (ref 140–450)
PMV BLD AUTO: 7 FL (ref 6–12)
POTASSIUM SERPL-SCNC: 4.2 MMOL/L (ref 3.7–5.3)
RBC # BLD AUTO: 4.32 M/UL (ref 4.5–5.9)
SARS-COV-2 RNA RESP QL NAA+PROBE: NORMAL
SARS-COV-2 RNA RESP QL NAA+PROBE: NOT DETECTED
SODIUM SERPL-SCNC: 138 MMOL/L (ref 135–144)
SOURCE: NORMAL
WBC OTHER # BLD: 10.3 K/UL (ref 3.5–11)

## 2023-09-22 PROCEDURE — 6370000000 HC RX 637 (ALT 250 FOR IP): Performed by: STUDENT IN AN ORGANIZED HEALTH CARE EDUCATION/TRAINING PROGRAM

## 2023-09-22 PROCEDURE — 85025 COMPLETE CBC W/AUTO DIFF WBC: CPT

## 2023-09-22 PROCEDURE — 99238 HOSP IP/OBS DSCHRG MGMT 30/<: CPT | Performed by: STUDENT IN AN ORGANIZED HEALTH CARE EDUCATION/TRAINING PROGRAM

## 2023-09-22 PROCEDURE — 6370000000 HC RX 637 (ALT 250 FOR IP): Performed by: SURGERY

## 2023-09-22 PROCEDURE — 80048 BASIC METABOLIC PNL TOTAL CA: CPT

## 2023-09-22 PROCEDURE — 2580000003 HC RX 258: Performed by: STUDENT IN AN ORGANIZED HEALTH CARE EDUCATION/TRAINING PROGRAM

## 2023-09-22 PROCEDURE — 82947 ASSAY GLUCOSE BLOOD QUANT: CPT

## 2023-09-22 PROCEDURE — 99232 SBSQ HOSP IP/OBS MODERATE 35: CPT | Performed by: INTERNAL MEDICINE

## 2023-09-22 PROCEDURE — 36415 COLL VENOUS BLD VENIPUNCTURE: CPT

## 2023-09-22 PROCEDURE — 6370000000 HC RX 637 (ALT 250 FOR IP): Performed by: CHIROPRACTOR

## 2023-09-22 PROCEDURE — 6370000000 HC RX 637 (ALT 250 FOR IP): Performed by: HOSPITALIST

## 2023-09-22 RX ADMIN — ALOGLIPTIN 12.5 MG: 12.5 TABLET, FILM COATED ORAL at 08:32

## 2023-09-22 RX ADMIN — GABAPENTIN 600 MG: 300 CAPSULE ORAL at 08:32

## 2023-09-22 RX ADMIN — QUETIAPINE FUMARATE 12.5 MG: 25 TABLET ORAL at 08:32

## 2023-09-22 RX ADMIN — METOPROLOL TARTRATE 12.5 MG: 25 TABLET, FILM COATED ORAL at 08:32

## 2023-09-22 RX ADMIN — ACETAMINOPHEN 650 MG: 325 TABLET ORAL at 00:45

## 2023-09-22 RX ADMIN — SODIUM CHLORIDE, PRESERVATIVE FREE 10 ML: 5 INJECTION INTRAVENOUS at 08:33

## 2023-09-22 RX ADMIN — INSULIN GLARGINE 10 UNITS: 100 INJECTION, SOLUTION SUBCUTANEOUS at 08:32

## 2023-09-22 ASSESSMENT — PAIN DESCRIPTION - DESCRIPTORS: DESCRIPTORS: ACHING

## 2023-09-22 ASSESSMENT — PAIN DESCRIPTION - LOCATION: LOCATION: SHOULDER

## 2023-09-22 ASSESSMENT — PAIN SCALES - GENERAL: PAINLEVEL_OUTOF10: 5

## 2023-09-22 NOTE — PROGRESS NOTES
Pt is being picked up at 0830 by transport to [facility] 9/22. CD is still needed as well as covid PCR results. Updated Tish Spears RN of this at shift report.

## 2023-09-22 NOTE — PROGRESS NOTES
clear to auscultation, no wheezes, rales or rhonchi, symmetric air entry  Heart - normal rate, regular rhythm, normal S1, S2, no murmurs, rubs, clicks or gallops  Abdomen - soft, nontender, nondistended, no masses or organomegaly  Neurological - alert, oriented, normal speech, weakness of both arms and hands. Musculoskeletal - no joint tenderness, deformity or swelling  Extremities -right below-knee amputation. Skin - normal coloration and turgor, no rashes, no suspicious skin lesions noted           DATA:      Labs:       CBC:   Recent Labs     09/21/23  0531 09/22/23  0735   WBC 14.3* 10.3   HGB 12.5* 13.2*   HCT 37.0* 39.2*    269       BMP:   Recent Labs     09/21/23  0531 09/22/23  0735    138   K 4.2 4.2   CO2 26 28   BUN 38* 31*   CREATININE 0.8 0.7   LABGLOM >60 >60   GLUCOSE 131* 123*       PT/INR: No results for input(s): \"PROTIME\", \"INR\" in the last 72 hours. APTT:No results for input(s): \"APTT\" in the last 72 hours. LIVER PROFILE:No results for input(s): \"AST\", \"ALT\", \"LABALBU\" in the last 72 hours. CT CHEST W CONTRAST  Narrative: EXAMINATION:  CT OF THE CHEST WITH CONTRAST    9/19/2023 10:33 pm    TECHNIQUE:  CT of the chest was performed with the administration of intravenous  contrast. Multiplanar reformatted images are provided for review. Automated  exposure control, iterative reconstruction, and/or weight based adjustment of  the mA/kV was utilized to reduce the radiation dose to as low as reasonably  achievable. COMPARISON:  Neck CTA 09/07/2020    HISTORY:  ORDERING SYSTEM PROVIDED HISTORY: lung mass . TECHNOLOGIST PROVIDED HISTORY:  lung mass . Reason for Exam: numbness, chest mass    FINDINGS:  MEDIASTINUM:  Normal heart size. Subendocardial fat and myocardial thinning  in the left ventricular apex consistent with prior myocardial infarction. No  pericardial effusion. No central pulmonary emboli. Moderate to severe  coronary atherosclerotic calcifications.   Mild percutaneous  biopsy is recommended given the size and features. 2. Minimal centrilobular and paraseptal emphysema. 3. A few additional incidental findings as above for which no dedicated  follow-up is recommended. RECOMMENDATIONS:  Incidental Pulmonary Nodules on CT    - Subsolid nodules, single, part solid    >=6 mm:  CT at 3-6 months to confirm persistence; if unchanged and solid  component remains less than 6 mm, annual CT should be performed for 5 years    Reference:  Jose Antonio Head al.  Guidelines for management of incidental  pulmonary nodules detected on CT images: From the Fleischner Society 2017. Radiology [id number];[id number]. IMPRESSION:    Primary Problem  Altered mental status    Active Hospital Problems    Diagnosis Date Noted    Cancer, metastatic to bone ([address]) [C79.51] 09/19/2023    Delirium due to another medical condition [F05] 09/18/2023    Central cord syndrome at C3 level of cervical spinal cord [facility]) [U41.127U] 09/15/2023    Cervical stenosis of spinal canal [M48.02] 09/12/2023    Elevated troponin [R77.8] 09/11/2023    Pain [R52] 09/11/2023    Goals of care, counseling/discussion [Z71.89] 09/11/2023    Encounter for palliative care [Z51.5] 09/11/2023    ACP (advance care planning) [Z71.89] 09/11/2023    Cervical myelopathy ([address]) [G95.9] 09/08/2023    Cerebral multi-infarct state [I69.30] 09/08/2023    Altered mental status [R41.82] 09/07/2023    Generalized weakness [R53.1] 09/07/2023    Below knee amputation ([address]) [S88.119A] 08/04/2023    Cigarette smoker [F17.210] 08/04/2023    Lung mass [R91.8] 08/04/2023    Peripheral arterial disease ([address]) [I73.9] 08/04/2023    CAD (coronary artery disease), native coronary artery [I25.10] 04/27/2013    Diabetes mellitus type 2 in obese ([address]) [E11.69, E66.9] 05/06/2009       RECOMMENDATIONS:  Records and labs and images were reviewed and discussed with the patient.   Status post surgical intervention with laminectomy and fusion  Bone biopsy

## 2023-09-22 NOTE — CARE COORDINATION
1455:  Writer spoke with [place] NP @ [address] ext [phone]. per  Dr. Juarez Burgos patient can restart the apixaban and aspirin if lung biopsy is not being done.

## 2023-09-22 NOTE — DISCHARGE INSTR - ACTIVITY
Max assist, q2h turn    Please clarify aspen collar with Dr. Maykel Lay office for further needs at discharge. Pt to maintain at this time.

## 2023-09-22 NOTE — CARE COORDINATION
Confirmed with Verena Livingston Coordinator receipt of COVID PCR. Pt will be transported around 11:15am but will not arrive with his wheelchair. The plan is to have his wheelchair delivered late tonite by [address]  patient notified and is agreeable. In the event wheelchair is not delivered patient was notified sister Maribel Orozco would be called to make arrangements. JULIO Rivas updated.

## 2023-09-22 NOTE — PROGRESS NOTES
Called lab regarding COVID PCR not being resulted. Forwarded to micro, states unsure why it has not been ran yet. Informed by this RN that it is needed for pt's discharge this am and states will try to have it resulted asap.

## 2023-09-22 NOTE — DISCHARGE SUMMARY
[facility]  Office: [phone]  Cyndi Mcnair DO, Caitlin Hair Blood, DO, Sheeba Snyder MD, Mega Allan MD, Marimar Hair MD, Reshma Bentley MD,  Dorothy Saucedo MD, Aileen Aguilar MD, Delcia Epley, DO, Khanh Velazquez MD,  Major Hinojosa DO, Pat Mcadams MD, Rosie Erickson MD, Leslee Steen, DO, Elvira Rm MD,  Isabel Yu DO, Geremias Jensen MD, Mala Thomas MD, Loan Hong MD, Nadeem Cunningham MD,  Jeni Garcia MD, Claudia Mandujano MD, Patsy Jeogn MD, Derrek Laughlin MD, Tatiana Gudino DO, Kalee Harris MD,  Ramya Pandey MD, Aileen Duke MD, Portillo Wallace, CNP,  Adarsh De Santiago, CNP,, Yen Marie, CNP,  Bruce Bosch, LESA, Jm Green, CNP, Jose Fields, CNP, Shantelle Ryan, CNP, Zuleyma Venegas, CNP, Joanne Olea, CNP, Leon Solomon, CNP, Angelo Gomes, CNS, Gautam Yadav, CNP, Riana Zapata, CNP         [facility]    Discharge Summary     Patient ID: Esha Dickerson  :  1950   MRN: [id number]     ACCOUNT:  [de-identified]   Patient's PCP: PROVIDER UNKNOWN  Admit Date: 2023   Discharge Date: 2023  Length of Stay: 15  Code Status:  Full Code  Admitting Physician: Rosie Erickson MD  Discharge Physician: Rosie Erickson MD     Active Discharge Diagnoses:     Hospital Problem Lists:  Principal Problem:    Altered mental status  Active Problems:    Generalized weakness    Below knee amputation [facility])    CAD (coronary artery disease), native coronary artery    Cigarette smoker    Diabetes mellitus type 2 in obese [facility])    Lung mass    Peripheral arterial disease ([address])    Cervical myelopathy (HCC)    Cerebral multi-infarct state    Elevated troponin    Pain    Goals of care, counseling/discussion    Encounter for palliative care    ACP (advance care planning)    Cervical stenosis of spinal canal    Central cord syndrome at C3 level of cervical spinal cord ([address]) Stage IV cancer, peripheral vascular disease (s/p BKA), COPD and multiple other medical comorbidities. Recommend goals of care discussion once lung biopsy results have been finalized. Significant therapeutic interventions: Decompressive laminectomy and cervical fusion; neurology, orthopedic surgery and oncology consultation     Significant Diagnostic Studies:   Labs:  Hematology:  Recent Labs     09/20/23 0449 09/21/23  0531 09/22/23  0735   WBC 13.1* 14.3* 10.3   RBC 4.13* 4.09* 4.32*   HGB 12.6* 12.5* 13.2*   HCT 37.8* 37.0* 39.2*   MCV 91.6 90.3 90.7   MCH 30.6 30.5 30.5   MCHC 33.4 33.7 33.7   RDW 14.4 14.1 14.0    272 269   MPV 7.1 7.0 7.0     Chemistry:  Recent Labs     09/20/23 0449 09/21/23  0531 09/22/23  0735    136 138   K 4.0 4.2 4.2    102 102   CO2 26 26 28   GLUCOSE 71 131* 123*   BUN 33* 38* 31*   CREATININE 0.8 0.8 0.7   ANIONGAP 10 8* 8*   LABGLOM >60 >60 >60   CALCIUM 8.9 8.8 9.1     Recent Labs     09/20/23  2009 09/21/23  0804 09/21/23  1206 09/21/23  1731 09/21/23  2103 09/22/23  0800   POCGLU 203* 133* 74* 175* 243* 130*     ABG:No results found for: \"POCPH\", \"PHART\", \"PH\", \"POCPCO2\", \"IXP4DDD\", \"PCO2\", \"POCPO2\", \"PO2ART\", \"PO2\", \"POCHCO3\", \"LIG2QYG\", \"HCO3\", \"NBEA\", \"PBEA\", \"BEART\", \"BE\", \"THGBART\", \"THB\", \"KQW1SPB\", \"HQLV3YLG\", \"C9QKKDLF\", \"O2SAT\", \"FIO2\"  No results found for: \"SPECIAL\"  No results found for: \"CULTURE\"    Radiology:  CT CHEST W CONTRAST    Result Date: 9/20/2023  1. A part solid nodule in the right upper lobe measures up to 2.7 cm accounts for the comparison CT finding. The features are consistent with primary bronchogenic carcinoma until proven otherwise, most likely adenocarcinoma. Short-term follow-up noncontrast chest CT is technically recommended in 3-6 months as below. However, further evaluation with PET/CT and/or percutaneous biopsy is recommended given the size and features. 2. Minimal centrilobular and paraseptal emphysema.  3. A few

## 2023-09-22 NOTE — PLAN OF CARE
Problem: Discharge Planning  Goal: Discharge to home or other facility with appropriate resources  9/22/2023 1134 by Sumi Osborn RN  Outcome: Adequate for Discharge  9/22/2023 1036 by Sumi Osborn RN  Outcome: Progressing  9/22/2023 0118 by Geovani Villanueva RN  Outcome: Progressing     Problem: Safety - Adult  Goal: Free from fall injury  9/22/2023 1134 by Sumi Osborn RN  Outcome: Adequate for Discharge  9/22/2023 1036 by Sumi Osborn RN  Outcome: Progressing  9/22/2023 0118 by Geovani Villanueva RN  Outcome: Progressing     Problem: Skin/Tissue Integrity  Goal: Absence of new skin breakdown  Description: 1. Monitor for areas of redness and/or skin breakdown  2. Assess vascular access sites hourly  3. Every 4-6 hours minimum:  Change oxygen saturation probe site  4. Every 4-6 hours:  If on nasal continuous positive airway pressure, respiratory therapy assess nares and determine need for appliance change or resting period.   9/22/2023 1134 by Sumi Osborn RN  Outcome: Adequate for Discharge  9/22/2023 1036 by Sumi Osborn RN  Outcome: Progressing  9/22/2023 0118 by Geovani Villanueva RN  Outcome: Progressing     Problem: Metabolic/Fluid and Electrolytes - Adult  Goal: Electrolytes maintained within normal limits  9/22/2023 1134 by Sumi Osborn RN  Outcome: Adequate for Discharge  9/22/2023 0118 by Geovani Villanueva RN  Outcome: Progressing  Goal: Hemodynamic stability and optimal renal function maintained  9/22/2023 1134 by Sumi Osborn RN  Outcome: Adequate for Discharge  9/22/2023 0118 by Geovani Villanueva RN  Outcome: Progressing     Problem: Chronic Conditions and Co-morbidities  Goal: Patient's chronic conditions and co-morbidity symptoms are monitored and maintained or improved  9/22/2023 1134 by Sumi Osborn RN  Outcome: Adequate for Discharge  9/22/2023 0118 by Geovani Villanueva RN  Outcome: Progressing     Problem: Pain  Goal: Verbalizes/displays adequate comfort level or baseline comfort level  9/22/2023 1134 by Bettie Marcus RN  Outcome: Adequate for Discharge  9/22/2023 0118 by Susie Mckeon RN  Outcome: Progressing  Flowsheets (Taken 9/21/2023 1733 by Sherren Ee, RN)  Verbalizes/displays adequate comfort level or baseline comfort level: Encourage patient to monitor pain and request assistance     Problem: ABCDS Injury Assessment  Goal: Absence of physical injury  9/22/2023 1134 by Bettie Marcus RN  Outcome: Adequate for Discharge  9/22/2023 0118 by Susie Mckeon RN  Outcome: Progressing     Problem: Nutrition Deficit:  Goal: Optimize nutritional status  9/22/2023 1134 by Bettie Marcus RN  Outcome: Adequate for Discharge  9/22/2023 0118 by Susie Mckeon RN  Outcome: Progressing

## 2023-09-22 NOTE — PLAN OF CARE
Problem: Discharge Planning  Goal: Discharge to home or other facility with appropriate resources  9/22/2023 1036 by Ronald Durant RN  Outcome: Progressing   Patient actively participates in ADLs and decision making regarding plan of care. Problem: Safety - Adult  Goal: Free from fall injury  9/22/2023 1036 by Ronald Durant RN  Outcome: Progressing   No falls/injuries this shift, bed in lowest position, brakes on, bed alarm on, call light in reach, side rails up x2. Problem: Skin/Tissue Integrity  Goal: Absence of new skin breakdown  Description: 1. Monitor for areas of redness and/or skin breakdown  2. Assess vascular access sites hourly  3. Every 4-6 hours minimum:  Change oxygen saturation probe site  4. Every 4-6 hours:  If on nasal continuous positive airway pressure, respiratory therapy assess nares and determine need for appliance change or resting period. 9/22/2023 1036 by Ronald Durant RN  Outcome: Progressing   No new skin breakdown noted, no new signs/symptoms of infection, continue to monitor lab work including WBC, medications administered per physician orders.

## 2023-09-22 NOTE — PROGRESS NOTES
Writer attempted to call report to [address] [place] [address] x2. Writer was told she would be called back at some point to give report. Pt discharged with belongings. Pt wheelchair will be picked up and taken to him at a later time. Wheelchair has a pt label on it and pt is aware of situation.

## 2023-09-22 NOTE — DISCHARGE INSTR - DIET

## 2023-09-22 NOTE — PROGRESS NOTES
Spoke with someone in microbiology again regarding COVID PCR, states should be resulted this AM around 1030.

## 2023-09-22 NOTE — PLAN OF CARE
Problem: Discharge Planning  Goal: Discharge to home or other facility with appropriate resources  9/22/2023 0118 by Mino Antunez RN  Outcome: Progressing  9/21/2023 1511 by Celene Lundborg, RN  Outcome: Progressing  Flowsheets (Taken 9/21/2023 0915)  Discharge to home or other facility with appropriate resources: Identify barriers to discharge with patient and caregiver     Problem: Safety - Adult  Goal: Free from fall injury  9/22/2023 0118 by Mino Antunez RN  Outcome: Progressing  9/21/2023 1511 by Celene Lundborg, RN  Outcome: Progressing  Flowsheets (Taken 9/21/2023 1136)  Free From Fall Injury: Instruct family/caregiver on patient safety     Problem: Skin/Tissue Integrity  Goal: Absence of new skin breakdown  Description: 1. Monitor for areas of redness and/or skin breakdown  2. Assess vascular access sites hourly  3. Every 4-6 hours minimum:  Change oxygen saturation probe site  4. Every 4-6 hours:  If on nasal continuous positive airway pressure, respiratory therapy assess nares and determine need for appliance change or resting period.   9/22/2023 0118 by Mino Antunez RN  Outcome: Progressing  9/21/2023 1511 by Celene Lundborg, RN  Outcome: Progressing     Problem: Metabolic/Fluid and Electrolytes - Adult  Goal: Electrolytes maintained within normal limits  9/22/2023 0118 by Mino Antunez RN  Outcome: Progressing  9/21/2023 1511 by Celene Lundborg, RN  Outcome: Progressing  Flowsheets (Taken 9/21/2023 0915)  Electrolytes maintained within normal limits: Monitor labs and assess patient for signs and symptoms of electrolyte imbalances  Goal: Hemodynamic stability and optimal renal function maintained  9/22/2023 0118 by Mino Antunez RN  Outcome: Progressing  9/21/2023 1511 by Celene Lundborg, RN  Outcome: Progressing  Flowsheets (Taken 9/21/2023 0915)  Hemodynamic stability and optimal renal function maintained: Monitor labs and assess for signs and symptoms of volume excess or deficit     Problem: Chronic Conditions and Co-morbidities  Goal: Patient's chronic conditions and co-morbidity symptoms are monitored and maintained or improved  9/22/2023 0118 by Rohan Brock RN  Outcome: Progressing  9/21/2023 1511 by Cheryl Olivo RN  Outcome: Progressing  Flowsheets (Taken 9/21/2023 0915)  Care Plan - Patient's Chronic Conditions and Co-Morbidity Symptoms are Monitored and Maintained or Improved: Monitor and assess patient's chronic conditions and comorbid symptoms for stability, deterioration, or improvement     Problem: Pain  Goal: Verbalizes/displays adequate comfort level or baseline comfort level  9/22/2023 0118 by Rohan Brock RN  Outcome: Progressing  Flowsheets (Taken 9/21/2023 1733 by Cheryl Olivo RN)  Verbalizes/displays adequate comfort level or baseline comfort level: Encourage patient to monitor pain and request assistance  9/21/2023 1511 by Cheryl Olivo RN  Outcome: Progressing  Flowsheets (Taken 9/21/2023 0803)  Verbalizes/displays adequate comfort level or baseline comfort level: Encourage patient to monitor pain and request assistance     Problem: ABCDS Injury Assessment  Goal: Absence of physical injury  9/22/2023 0118 by Rohan Brock RN  Outcome: Progressing  9/21/2023 1511 by Cheryl Olivo RN  Outcome: Progressing  Flowsheets (Taken 9/21/2023 1136)  Absence of Physical Injury: Implement safety measures based on patient assessment     Problem: Nutrition Deficit:  Goal: Optimize nutritional status  9/22/2023 0118 by Rohan Brock RN  Outcome: Progressing  9/21/2023 1511 by Cheryl Olivo RN  Outcome: Progressing

## 2024-05-23 NOTE — PROGRESS NOTES
"  Call Dr. Christiano Cazares, Gastroenterology at [phone] if you have any problems or concerns.    We know you have a Choice in healthcare and appreciate you using [facility].  Our purpose is to provide you \"Excellent Care\".  We hope that you will always choose us in the future and continue to recommend us to your family and friends.                " Occupational Therapy  Facility/Department: [facility]  Occupational Therapy Initial Assessment    Name: Cristopher Roberson  : 1950  MRN: [id number]  Date of Service: 2023    Discharge Recommendations:  Patient would benefit from continued therapy after discharge  OT Equipment Recommendations  Other: continue to assess       Patient Diagnosis(es): The primary encounter diagnosis was Elevated troponin. A diagnosis of Generalized weakness was also pertinent to this visit. Past Medical History:  has a past medical history of CAD (coronary artery disease), Falls, Myocardial infarction ([address]), PVD (peripheral vascular disease) ([address]), and Urinary retention. Past Surgical History:  has a past surgical history that includes Leg amputation below knee (Right) and Cardiac surgery. Assessment   Performance deficits / Impairments: Decreased functional mobility ; Decreased safe awareness;Decreased balance;Decreased coordination;Decreased ROM; Decreased endurance;Decreased ADL status; Decreased strength  Assessment: Pt progressing slowly towards STGs. Patient continues to demonstrate above deficits with balance, strength, tolerance being the main issues that limit pt skill. Pt states that a little over a month ago he was Indep from chair level with self care and family has assisted with home skills. Has been in the hospital and SNF within the last month and was a 2 assist with SPT and assisted with ADLs. Would benefit from OT here and at discharge to maximize participation in self care. Pt unsafe to return to prior living situation. Prognosis: Fair  REQUIRES OT FOLLOW-UP: Yes  Activity Tolerance  Activity Tolerance: Patient Tolerated treatment well        Plan   Occupational Therapy Plan  Times Per Week: 5-6x/wk  Times Per Day:  Once a day  Current Treatment Recommendations: Strengthening, ROM, Balance training, Functional mobility training, Endurance training, Neuromuscular re-education,

## (undated) DEVICE — SUTURE VCRL SZ 0 L36IN ABSRB UD L36MM CT-1 1/2 CIR J946H

## (undated) DEVICE — DRESSING TRNSPAR W5XL4.5IN FLM SHT SEMIPERMEABLE WIND

## (undated) DEVICE — APPLICATOR MEDICATED 10.5 CC SOLUTION HI LT ORNG CHLORAPREP

## (undated) DEVICE — SPONGE GZ W3XL3IN 4 PLY RAYON POLY STD NONWOVEN

## (undated) DEVICE — SUTURE VCRL + SZ 2-0 L27IN ABSRB WHT SH 1/2 CIR TAPERCUT VCP417H

## (undated) DEVICE — BLADE ES ELASTOMERIC COAT INSUL DURABLE BEND UPTO 90DEG

## (undated) DEVICE — 3.0MM X-COARSE DIAMOND

## (undated) DEVICE — INTENDED FOR TISSUE SEPARATION, AND OTHER PROCEDURES THAT REQUIRE A SHARP SURGICAL BLADE TO PUNCTURE OR CUT.: Brand: BARD-PARKER ® CARBON RIB-BACK BLADES

## (undated) DEVICE — SUTURE ABSRB BRAID COAT VLT MO-4 NO 1 27IN VCRL J437H

## (undated) DEVICE — SOLUTION IRRIG 1000ML 0.9% SOD CHL USP POUR PLAS BTL

## (undated) DEVICE — Device: Brand: CORUS SPINAL SYSTEM-X

## (undated) DEVICE — CONTAINER,SPECIMEN,4OZ,OR STRL: Brand: MEDLINE

## (undated) DEVICE — BLANKET WRM W40.2XL55.9IN IORT LO BODY + MISTRAL AIR

## (undated) DEVICE — DRESSING HYDROFIBER AQUACEL AG ADVANTAGE 3.5X6 IN

## (undated) DEVICE — 3.0MM ROUND FLUTED SOFT TOUCH

## (undated) DEVICE — ELECTRODE PT RET AD L9FT HI MOIST COND ADH HYDRGEL CORDED

## (undated) DEVICE — MHPB ORTHO GENERAL PACK: Brand: MEDLINE INDUSTRIES, INC.

## (undated) DEVICE — DRAPE,T,LAPARO,TRANS,STERILE: Brand: MEDLINE

## (undated) DEVICE — NEEDLE SPNL 18GA L3.5IN W/ QNCKE SHARPER BVL DURA CLICK

## (undated) DEVICE — NEPTUNE E-SEP SMOKE EVACUATION PENCIL, COATED, 70MM BLADE, PUSH BUTTON SWITCH: Brand: NEPTUNE E-SEP

## (undated) DEVICE — COVER,C-ARM,41X74: Brand: MEDLINE

## (undated) DEVICE — THE PMT CERVICAL VISUALIZATION HARNESS STRETCHES TO CONFORM TO THE OUTER SHOULDER WHILE HOLDING TRACTION SECURELY.IT IS USED TO ACHIEVE SAFE, EFFECTIVE TRACTION ON THE SHOULDERS FOR INTRAOPERATIVE CERVICAL X-RAYS.: Brand: PMT CERVICAL HARNESS

## (undated) DEVICE — 3M™ IOBAN™ 2 ANTIMICROBIAL INCISE DRAPE 6650EZ: Brand: IOBAN™ 2